# Patient Record
Sex: MALE | Race: WHITE | NOT HISPANIC OR LATINO | Employment: FULL TIME | URBAN - NONMETROPOLITAN AREA
[De-identification: names, ages, dates, MRNs, and addresses within clinical notes are randomized per-mention and may not be internally consistent; named-entity substitution may affect disease eponyms.]

---

## 2017-01-02 ENCOUNTER — HOSPITAL ENCOUNTER (OUTPATIENT)
Dept: GENERAL RADIOLOGY | Facility: HOSPITAL | Age: 32
End: 2017-01-02

## 2017-01-03 ENCOUNTER — HOSPITAL ENCOUNTER (OUTPATIENT)
Dept: GENERAL RADIOLOGY | Facility: HOSPITAL | Age: 32
Discharge: HOME | End: 2017-01-03
Admitting: PEDIATRICS
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
## 2017-01-03 PROCEDURE — 73110 X-RAY EXAM OF WRIST: CPT

## 2017-01-14 ENCOUNTER — APPOINTMENT (OUTPATIENT)
Dept: GENERAL RADIOLOGY | Age: 32
End: 2017-01-14
Payer: MEDICAID

## 2017-01-14 ENCOUNTER — HOSPITAL ENCOUNTER (EMERGENCY)
Age: 32
Discharge: HOME | End: 2017-01-14
Payer: MEDICAID

## 2017-01-14 VITALS
HEIGHT: 69 IN | RESPIRATION RATE: 16 BRPM | DIASTOLIC BLOOD PRESSURE: 90 MMHG | SYSTOLIC BLOOD PRESSURE: 139 MMHG | BODY MASS INDEX: 29.62 KG/M2 | TEMPERATURE: 97.8 F | HEART RATE: 80 BPM | WEIGHT: 200 LBS | OXYGEN SATURATION: 98 %

## 2017-01-14 DIAGNOSIS — S22.31XA CLOSED FRACTURE OF ONE RIB OF RIGHT SIDE, INITIAL ENCOUNTER: Primary | ICD-10-CM

## 2017-01-14 PROCEDURE — 99282 EMERGENCY DEPT VISIT SF MDM: CPT | Performed by: NURSE PRACTITIONER

## 2017-01-14 PROCEDURE — 6360000002 HC RX W HCPCS: Performed by: NURSE PRACTITIONER

## 2017-01-14 PROCEDURE — 99283 EMERGENCY DEPT VISIT LOW MDM: CPT

## 2017-01-14 PROCEDURE — 96372 THER/PROPH/DIAG INJ SC/IM: CPT

## 2017-01-14 PROCEDURE — 72100 X-RAY EXAM L-S SPINE 2/3 VWS: CPT

## 2017-01-14 RX ORDER — CITALOPRAM 10 MG/1
10 TABLET ORAL DAILY
COMMUNITY
End: 2020-02-17

## 2017-01-14 RX ORDER — HYDROCODONE BITARTRATE AND ACETAMINOPHEN 7.5; 325 MG/1; MG/1
1 TABLET ORAL EVERY 6 HOURS PRN
Qty: 20 TABLET | Refills: 0 | Status: SHIPPED | OUTPATIENT
Start: 2017-01-14 | End: 2020-02-17

## 2017-01-14 RX ORDER — ORPHENADRINE CITRATE 30 MG/ML
60 INJECTION INTRAMUSCULAR; INTRAVENOUS ONCE
Status: COMPLETED | OUTPATIENT
Start: 2017-01-14 | End: 2017-01-14

## 2017-01-14 RX ORDER — KETOROLAC TROMETHAMINE 30 MG/ML
60 INJECTION, SOLUTION INTRAMUSCULAR; INTRAVENOUS ONCE
Status: COMPLETED | OUTPATIENT
Start: 2017-01-14 | End: 2017-01-14

## 2017-01-14 RX ADMIN — ORPHENADRINE CITRATE 60 MG: 30 INJECTION INTRAMUSCULAR; INTRAVENOUS at 07:23

## 2017-01-14 RX ADMIN — KETOROLAC TROMETHAMINE 60 MG: 30 INJECTION, SOLUTION INTRAMUSCULAR at 07:23

## 2017-01-14 RX ADMIN — HYDROMORPHONE HYDROCHLORIDE 1 MG: 1 INJECTION, SOLUTION INTRAMUSCULAR; INTRAVENOUS; SUBCUTANEOUS at 07:24

## 2017-01-14 ASSESSMENT — ENCOUNTER SYMPTOMS: RESPIRATORY NEGATIVE: 1

## 2017-01-14 ASSESSMENT — PAIN DESCRIPTION - PAIN TYPE: TYPE: ACUTE PAIN

## 2017-01-14 ASSESSMENT — PAIN SCALES - GENERAL
PAINLEVEL_OUTOF10: 10
PAINLEVEL_OUTOF10: 5

## 2017-01-14 ASSESSMENT — PAIN DESCRIPTION - LOCATION: LOCATION: BACK

## 2017-03-07 ENCOUNTER — HOSPITAL ENCOUNTER (EMERGENCY)
Age: 32
Discharge: LEFT BEFORE DISCHARGE | End: 2017-03-07
Attending: EMERGENCY MEDICINE
Payer: MEDICAID

## 2017-03-07 ENCOUNTER — APPOINTMENT (OUTPATIENT)
Dept: CT IMAGING | Age: 32
End: 2017-03-07
Payer: MEDICAID

## 2017-03-07 VITALS
DIASTOLIC BLOOD PRESSURE: 94 MMHG | SYSTOLIC BLOOD PRESSURE: 135 MMHG | TEMPERATURE: 97.4 F | HEIGHT: 69 IN | WEIGHT: 200 LBS | RESPIRATION RATE: 20 BRPM | OXYGEN SATURATION: 96 % | HEART RATE: 101 BPM | BODY MASS INDEX: 29.62 KG/M2

## 2017-03-07 DIAGNOSIS — R51.9 HEADACHE, UNSPECIFIED HEADACHE TYPE: Primary | ICD-10-CM

## 2017-03-07 LAB
HCT VFR BLD CALC: 47.5 % (ref 42–52)
HEMOGLOBIN: 16.1 G/DL (ref 14–18)
INR BLD: 0.94 (ref 0.88–1.18)
MCH RBC QN AUTO: 30.1 PG (ref 27–31)
MCHC RBC AUTO-ENTMCNC: 33.9 G/DL (ref 33–37)
MCV RBC AUTO: 88.8 FL (ref 80–94)
PDW BLD-RTO: 13.3 % (ref 11.5–14.5)
PLATELET # BLD: 280 K/UL (ref 130–400)
PMV BLD AUTO: 10 FL (ref 7.4–10.4)
PROTHROMBIN TIME: 12.6 SEC (ref 12–14.6)
RBC # BLD: 5.35 M/UL (ref 4.7–6.1)
WBC # BLD: 9.6 K/UL (ref 4.8–10.8)

## 2017-03-07 PROCEDURE — 99282 EMERGENCY DEPT VISIT SF MDM: CPT | Performed by: EMERGENCY MEDICINE

## 2017-03-07 PROCEDURE — 6360000002 HC RX W HCPCS: Performed by: EMERGENCY MEDICINE

## 2017-03-07 PROCEDURE — 96374 THER/PROPH/DIAG INJ IV PUSH: CPT

## 2017-03-07 PROCEDURE — 85027 COMPLETE CBC AUTOMATED: CPT

## 2017-03-07 PROCEDURE — 99284 EMERGENCY DEPT VISIT MOD MDM: CPT

## 2017-03-07 PROCEDURE — 36415 COLL VENOUS BLD VENIPUNCTURE: CPT

## 2017-03-07 PROCEDURE — 70450 CT HEAD/BRAIN W/O DYE: CPT

## 2017-03-07 PROCEDURE — 2580000003 HC RX 258: Performed by: EMERGENCY MEDICINE

## 2017-03-07 PROCEDURE — 85610 PROTHROMBIN TIME: CPT

## 2017-03-07 PROCEDURE — 96372 THER/PROPH/DIAG INJ SC/IM: CPT

## 2017-03-07 RX ORDER — 0.9 % SODIUM CHLORIDE 0.9 %
1000 INTRAVENOUS SOLUTION INTRAVENOUS ONCE
Status: COMPLETED | OUTPATIENT
Start: 2017-03-07 | End: 2017-03-07

## 2017-03-07 RX ORDER — PROMETHAZINE HYDROCHLORIDE 25 MG/ML
6.25 INJECTION, SOLUTION INTRAMUSCULAR; INTRAVENOUS ONCE
Status: COMPLETED | OUTPATIENT
Start: 2017-03-07 | End: 2017-03-07

## 2017-03-07 RX ORDER — KETOROLAC TROMETHAMINE 30 MG/ML
30 INJECTION, SOLUTION INTRAMUSCULAR; INTRAVENOUS ONCE
Status: COMPLETED | OUTPATIENT
Start: 2017-03-07 | End: 2017-03-07

## 2017-03-07 RX ADMIN — SODIUM CHLORIDE 1000 ML: 9 INJECTION, SOLUTION INTRAVENOUS at 13:26

## 2017-03-07 RX ADMIN — PROMETHAZINE HYDROCHLORIDE 6.25 MG: 25 INJECTION, SOLUTION INTRAMUSCULAR; INTRAVENOUS at 13:26

## 2017-03-07 RX ADMIN — KETOROLAC TROMETHAMINE 30 MG: 30 INJECTION, SOLUTION INTRAMUSCULAR at 13:26

## 2017-03-07 ASSESSMENT — ENCOUNTER SYMPTOMS
SWOLLEN GLANDS: 0
EYE PAIN: 0
VOMITING: 1
DIARRHEA: 0
BLURRED VISION: 0
ALLERGIC/IMMUNOLOGIC NEGATIVE: 1
ABDOMINAL PAIN: 0
SINUS PRESSURE: 0
SORE THROAT: 0
NAUSEA: 1
TINGLING: 0
COUGH: 0
PHOTOPHOBIA: 0
BACK PAIN: 0
VISUAL CHANGE: 0
EYES NEGATIVE: 1
RESPIRATORY NEGATIVE: 1

## 2017-03-07 ASSESSMENT — PAIN SCALES - GENERAL
PAINLEVEL_OUTOF10: 10
PAINLEVEL_OUTOF10: 8

## 2017-03-07 ASSESSMENT — PAIN DESCRIPTION - LOCATION: LOCATION: HEAD

## 2017-03-07 ASSESSMENT — PAIN DESCRIPTION - PAIN TYPE: TYPE: ACUTE PAIN

## 2017-10-14 ENCOUNTER — HOSPITAL ENCOUNTER (EMERGENCY)
Facility: HOSPITAL | Age: 32
Discharge: HOME | End: 2017-10-14
Attending: EMERGENCY MEDICINE | Admitting: EMERGENCY MEDICINE

## 2017-10-14 VITALS
HEART RATE: 108 BPM | WEIGHT: 180 LBS | RESPIRATION RATE: 16 BRPM | OXYGEN SATURATION: 100 % | DIASTOLIC BLOOD PRESSURE: 76 MMHG | SYSTOLIC BLOOD PRESSURE: 127 MMHG | TEMPERATURE: 98 F | HEIGHT: 70 IN | BODY MASS INDEX: 25.77 KG/M2

## 2017-10-14 DIAGNOSIS — R10.9 FLANK PAIN: Primary | ICD-10-CM

## 2017-10-14 PROCEDURE — 99282 EMERGENCY DEPT VISIT SF MDM: CPT

## 2017-10-14 RX ORDER — LAMOTRIGINE 100 MG/1
100 TABLET ORAL DAILY
COMMUNITY

## 2017-10-14 RX ORDER — METHYLPHENIDATE HYDROCHLORIDE 18 MG/1
36 TABLET, EXTENDED RELEASE ORAL EVERY MORNING
COMMUNITY
End: 2017-11-26 | Stop reason: HOSPADM

## 2017-10-14 RX ORDER — LISINOPRIL 10 MG/1
10 TABLET ORAL EVERY MORNING
COMMUNITY

## 2017-10-14 NOTE — DISCHARGE INSTRUCTIONS

## 2017-10-14 NOTE — ED PROVIDER NOTES
Subjective patient is a 32-year-old male who presents to the ER with right flank pain.  Patient states that around 10 PM last night he developed the sudden onset of right flank pain.  Patient describes the pain as sharp and radiated to his right lower quadrant.  Patient states he has had kidney stones in the past and the pain was identical.  Patient states the pain lasted for approximately 2-3 hours and he has had no pain since that time.  Patient states that while he was having the flank pain he also had urinary hesitancy.  Patient states he was then able urinate and has had no pain since that time.  Patient had have surgery to remove his previous stone.  Patient currently has no pain at all.  He denies any fever, chest pain, shortness of breath, nausea vomiting diarrhea,  Dysuria, hematuria, neurological changes.    History provided by:  Patient   used: No        Review of Systems   Constitutional: Negative.    HENT: Negative.    Eyes: Negative.    Respiratory: Negative.    Cardiovascular: Negative.    Gastrointestinal: Positive for abdominal pain.   Endocrine: Negative.    Genitourinary: Positive for difficulty urinating and flank pain.   Skin: Negative.    Allergic/Immunologic: Negative.    Neurological: Negative.    Hematological: Negative.    Psychiatric/Behavioral: Negative.    All other systems reviewed and are negative.      Past Medical History:   Diagnosis Date   • Hypertension    • Kidney stone        Allergies   Allergen Reactions   • Penicillins Anaphylaxis       Past Surgical History:   Procedure Laterality Date   • FL CYSTO URETHROSCOPY W REM FB     • MANDIBLE FRACTURE SURGERY         History reviewed. No pertinent family history.    Social History     Social History   • Marital status:      Spouse name: N/A   • Number of children: N/A   • Years of education: N/A     Social History Main Topics   • Smoking status: Current Some Day Smoker     Packs/day: 0.50     Types:  Cigarettes   • Smokeless tobacco: Never Used   • Alcohol use No   • Drug use: No   • Sexual activity: Defer     Other Topics Concern   • None     Social History Narrative   • None           Objective   Physical Exam   Constitutional: He is oriented to person, place, and time. He appears well-developed and well-nourished.   HENT:   Head: Normocephalic and atraumatic.   Eyes: Conjunctivae are normal. Pupils are equal, round, and reactive to light.   Neck: Normal range of motion.   Cardiovascular: Normal rate, regular rhythm and normal heart sounds.    Pulmonary/Chest: Effort normal and breath sounds normal.   Abdominal: Soft. There is no tenderness. There is no rigidity, no rebound, no guarding and no CVA tenderness.   Musculoskeletal: Normal range of motion. He exhibits no edema or deformity.   Neurological: He is alert and oriented to person, place, and time. He has normal strength.   Skin: Skin is warm.   Psychiatric: He has a normal mood and affect. His behavior is normal.   Nursing note and vitals reviewed.      Procedures         ED Course  ED Course       exam was unremarkable.  Patient was currently asymptomatic.  I offered to perform labs and CT stone protocol to rule out stone and infection.  Patient states since he currently had no symptoms he did not want any treatment at this time.  He understands the risk of infection but states he will return if he develops any pain, fever or vomiting.  Patient was advised to follow-up with his PCP and his urologist.  Return for any symptoms.            MDM  Number of Diagnoses or Management Options  Flank pain:       Final diagnoses:   Flank pain            Alina Durham MD  10/14/17 0921

## 2017-10-17 NOTE — ED NOTES
"ED Call Back Questions    1. How are you doing since leaving the Emergency Department?    Feels great, no pain at all  2. Do you have any questions about your discharge instructions? No     3. Have you filled your new prescriptions yet? N/A  a. Do you have any questions about those medications? No     4. Were you able to make a follow-up appointment with the physician? Yes     5. Do you have a primary care physician? Yes   a. If No, would you like for me to set you up with one? No   i. If Yes, “I will have our ED  give you a call right back at this number to work with you on the best time for an appointment.”    6. We are always looking to get better at what we do. Do you have any suggestions for what we can do to be even better? No   a. If Yes, \"Thank you for sharing your concerns. I apologize. I will follow up with our manager and patient . Would you like someone to call you back?\" No     7. Is there anything else I can do for you? No   Everyone was very nice     Terry Fritz  10/17/17 1413    "

## 2017-10-28 ENCOUNTER — HOSPITAL ENCOUNTER (EMERGENCY)
Facility: HOSPITAL | Age: 32
Discharge: HOME | End: 2017-10-28
Attending: EMERGENCY MEDICINE | Admitting: EMERGENCY MEDICINE

## 2017-10-28 ENCOUNTER — APPOINTMENT (OUTPATIENT)
Dept: CT IMAGING | Facility: HOSPITAL | Age: 32
End: 2017-10-28

## 2017-10-28 VITALS
WEIGHT: 176 LBS | HEART RATE: 93 BPM | DIASTOLIC BLOOD PRESSURE: 90 MMHG | HEIGHT: 70 IN | TEMPERATURE: 97.8 F | BODY MASS INDEX: 25.2 KG/M2 | RESPIRATION RATE: 17 BRPM | OXYGEN SATURATION: 97 % | SYSTOLIC BLOOD PRESSURE: 143 MMHG

## 2017-10-28 DIAGNOSIS — N20.0 KIDNEY STONE: Primary | ICD-10-CM

## 2017-10-28 LAB
ALBUMIN SERPL-MCNC: 4.6 G/DL (ref 3.5–5)
ALBUMIN/GLOB SERPL: 1.6 G/DL (ref 1.1–2.5)
ALP SERPL-CCNC: 65 U/L (ref 24–120)
ALT SERPL W P-5'-P-CCNC: 51 U/L (ref 0–54)
ANION GAP SERPL CALCULATED.3IONS-SCNC: 13 MMOL/L (ref 4–13)
AST SERPL-CCNC: 31 U/L (ref 7–45)
BACTERIA UR QL AUTO: ABNORMAL /HPF
BASOPHILS # BLD AUTO: 0.05 10*3/MM3 (ref 0–0.2)
BASOPHILS NFR BLD AUTO: 0.4 % (ref 0–2)
BILIRUB SERPL-MCNC: 0.3 MG/DL (ref 0.1–1)
BILIRUB UR QL STRIP: NEGATIVE
BUN BLD-MCNC: 13 MG/DL (ref 5–21)
BUN/CREAT SERPL: 11.3 (ref 7–25)
CALCIUM SPEC-SCNC: 10.1 MG/DL (ref 8.4–10.4)
CHLORIDE SERPL-SCNC: 102 MMOL/L (ref 98–110)
CLARITY UR: ABNORMAL
CO2 SERPL-SCNC: 28 MMOL/L (ref 24–31)
COLOR UR: YELLOW
CREAT BLD-MCNC: 1.15 MG/DL (ref 0.5–1.4)
DEPRECATED RDW RBC AUTO: 41.5 FL (ref 40–54)
EOSINOPHIL # BLD AUTO: 0.17 10*3/MM3 (ref 0–0.7)
EOSINOPHIL NFR BLD AUTO: 1.3 % (ref 0–4)
ERYTHROCYTE [DISTWIDTH] IN BLOOD BY AUTOMATED COUNT: 12.9 % (ref 12–15)
GFR SERPL CREATININE-BSD FRML MDRD: 73 ML/MIN/1.73
GLOBULIN UR ELPH-MCNC: 2.9 GM/DL
GLUCOSE BLD-MCNC: 121 MG/DL (ref 70–100)
GLUCOSE UR STRIP-MCNC: NEGATIVE MG/DL
HCT VFR BLD AUTO: 43.6 % (ref 40–52)
HGB BLD-MCNC: 14.9 G/DL (ref 14–18)
HGB UR QL STRIP.AUTO: ABNORMAL
HYALINE CASTS UR QL AUTO: ABNORMAL /LPF
IMM GRANULOCYTES # BLD: 0.03 10*3/MM3 (ref 0–0.03)
IMM GRANULOCYTES NFR BLD: 0.2 % (ref 0–5)
KETONES UR QL STRIP: ABNORMAL
LEUKOCYTE ESTERASE UR QL STRIP.AUTO: ABNORMAL
LYMPHOCYTES # BLD AUTO: 1.47 10*3/MM3 (ref 0.72–4.86)
LYMPHOCYTES NFR BLD AUTO: 11.5 % (ref 15–45)
MCH RBC QN AUTO: 29.8 PG (ref 28–32)
MCHC RBC AUTO-ENTMCNC: 34.2 G/DL (ref 33–36)
MCV RBC AUTO: 87.2 FL (ref 82–95)
MONOCYTES # BLD AUTO: 0.87 10*3/MM3 (ref 0.19–1.3)
MONOCYTES NFR BLD AUTO: 6.8 % (ref 4–12)
NEUTROPHILS # BLD AUTO: 10.22 10*3/MM3 (ref 1.87–8.4)
NEUTROPHILS NFR BLD AUTO: 79.8 % (ref 39–78)
NITRITE UR QL STRIP: NEGATIVE
PH UR STRIP.AUTO: 8 [PH] (ref 5–8)
PLATELET # BLD AUTO: 284 10*3/MM3 (ref 130–400)
PMV BLD AUTO: 10.3 FL (ref 6–12)
POTASSIUM BLD-SCNC: 3.9 MMOL/L (ref 3.5–5.3)
PROT SERPL-MCNC: 7.5 G/DL (ref 6.3–8.7)
PROT UR QL STRIP: ABNORMAL
RBC # BLD AUTO: 5 10*6/MM3 (ref 4.8–5.9)
RBC # UR: ABNORMAL /HPF
REF LAB TEST METHOD: ABNORMAL
SODIUM BLD-SCNC: 143 MMOL/L (ref 135–145)
SP GR UR STRIP: 1.02 (ref 1–1.03)
SQUAMOUS #/AREA URNS HPF: ABNORMAL /HPF
UROBILINOGEN UR QL STRIP: ABNORMAL
WBC NRBC COR # BLD: 12.81 10*3/MM3 (ref 4.8–10.8)
WBC UR QL AUTO: ABNORMAL /HPF

## 2017-10-28 PROCEDURE — 80053 COMPREHEN METABOLIC PANEL: CPT | Performed by: EMERGENCY MEDICINE

## 2017-10-28 PROCEDURE — 74176 CT ABD & PELVIS W/O CONTRAST: CPT

## 2017-10-28 PROCEDURE — 96374 THER/PROPH/DIAG INJ IV PUSH: CPT

## 2017-10-28 PROCEDURE — 25010000002 KETOROLAC TROMETHAMINE PER 15 MG: Performed by: EMERGENCY MEDICINE

## 2017-10-28 PROCEDURE — 99284 EMERGENCY DEPT VISIT MOD MDM: CPT

## 2017-10-28 PROCEDURE — 25010000002 ONDANSETRON PER 1 MG: Performed by: EMERGENCY MEDICINE

## 2017-10-28 PROCEDURE — 85025 COMPLETE CBC W/AUTO DIFF WBC: CPT | Performed by: EMERGENCY MEDICINE

## 2017-10-28 PROCEDURE — 25010000002 HYDROMORPHONE PER 4 MG: Performed by: EMERGENCY MEDICINE

## 2017-10-28 PROCEDURE — 81001 URINALYSIS AUTO W/SCOPE: CPT | Performed by: EMERGENCY MEDICINE

## 2017-10-28 PROCEDURE — 96361 HYDRATE IV INFUSION ADD-ON: CPT

## 2017-10-28 RX ORDER — HYDROCODONE BITARTRATE AND ACETAMINOPHEN 5; 325 MG/1; MG/1
1 TABLET ORAL EVERY 4 HOURS PRN
Qty: 20 TABLET | Refills: 0 | Status: SHIPPED | OUTPATIENT
Start: 2017-10-28 | End: 2017-11-17

## 2017-10-28 RX ORDER — TAMSULOSIN HYDROCHLORIDE 0.4 MG/1
1 CAPSULE ORAL NIGHTLY
Qty: 30 CAPSULE | Refills: 0 | Status: SHIPPED | OUTPATIENT
Start: 2017-10-28 | End: 2017-11-17

## 2017-10-28 RX ORDER — ONDANSETRON 2 MG/ML
4 INJECTION INTRAMUSCULAR; INTRAVENOUS ONCE
Status: COMPLETED | OUTPATIENT
Start: 2017-10-28 | End: 2017-10-28

## 2017-10-28 RX ORDER — KETOROLAC TROMETHAMINE 15 MG/ML
60 INJECTION, SOLUTION INTRAMUSCULAR; INTRAVENOUS ONCE
Status: COMPLETED | OUTPATIENT
Start: 2017-10-28 | End: 2017-10-28

## 2017-10-28 RX ORDER — ONDANSETRON 4 MG/1
4 TABLET, ORALLY DISINTEGRATING ORAL EVERY 8 HOURS PRN
Qty: 10 TABLET | Refills: 0 | Status: SHIPPED | OUTPATIENT
Start: 2017-10-28 | End: 2017-11-26 | Stop reason: HOSPADM

## 2017-10-28 RX ORDER — KETOROLAC TROMETHAMINE 30 MG/ML
60 INJECTION, SOLUTION INTRAMUSCULAR; INTRAVENOUS ONCE
Status: DISCONTINUED | OUTPATIENT
Start: 2017-10-28 | End: 2017-10-28

## 2017-10-28 RX ADMIN — SODIUM CHLORIDE 1000 ML: 9 INJECTION, SOLUTION INTRAVENOUS at 21:02

## 2017-10-28 RX ADMIN — ONDANSETRON 4 MG: 2 INJECTION INTRAMUSCULAR; INTRAVENOUS at 20:59

## 2017-10-28 RX ADMIN — KETOROLAC TROMETHAMINE 60 MG: 15 INJECTION, SOLUTION INTRAMUSCULAR; INTRAVENOUS at 20:59

## 2017-10-28 RX ADMIN — HYDROMORPHONE HYDROCHLORIDE 1 MG: 1 INJECTION, SOLUTION INTRAMUSCULAR; INTRAVENOUS; SUBCUTANEOUS at 22:17

## 2017-10-29 NOTE — ED PROVIDER NOTES
Subjective   HPI Comments: 42-year-old gentleman presents for our facility with family members with a complaint of right-sided flank pain which started approximately 1 hour prior to presentation.  He has had several episodes of vomiting associated with this.  He is holding the right side of his flank states the pain is in this area a sharp pain 10 out of 10 intensity and radiates to the right groin area.    This started acute an hour ago.    He has a history of kidney stones he has had about a year ago.    He has passed urine after coming into the emergency room very small amount.        Patient is a 32 y.o. male presenting with flank pain.   History provided by:  Patient   used: No    Flank Pain   Pain location:  R flank  Pain quality: sharp    Pain severity:  Severe  Onset quality:  Sudden  Duration:  1 hour  Timing:  Constant  Progression:  Worsening  Chronicity:  Recurrent  Context: not alcohol use, not diet changes, not eating, not laxative use, not previous surgeries, not recent illness, not recent travel, not retching and not trauma    Relieved by:  Nothing  Worsened by:  Nothing  Ineffective treatments:  None tried  Associated symptoms: flatus    Associated symptoms: no chest pain, no chills, no constipation, no cough, no diarrhea, no fever, no hematemesis and no hematuria        Review of Systems   Constitutional: Negative.  Negative for chills and fever.   Respiratory: Negative for cough.    Cardiovascular: Negative for chest pain.   Gastrointestinal: Positive for flatus. Negative for constipation, diarrhea and hematemesis.   Genitourinary: Positive for flank pain. Negative for hematuria.   All other systems reviewed and are negative.      Past Medical History:   Diagnosis Date   • Hypertension    • Kidney stone        Allergies   Allergen Reactions   • Penicillins Anaphylaxis       Past Surgical History:   Procedure Laterality Date   • FL CYSTO URETHROSCOPY W REM FB     • MANDIBLE  FRACTURE SURGERY         History reviewed. No pertinent family history.    Social History     Social History   • Marital status:      Spouse name: N/A   • Number of children: N/A   • Years of education: N/A     Social History Main Topics   • Smoking status: Current Some Day Smoker     Packs/day: 0.50     Types: Cigarettes   • Smokeless tobacco: Never Used   • Alcohol use No   • Drug use: No   • Sexual activity: Defer     Other Topics Concern   • None     Social History Narrative           Objective   Physical Exam   Constitutional: He appears well-developed and well-nourished.   Significant amount of distress on examination he is unable to lay flat on the bed because of the pain discomfort he is experiencing is doubled over with a bag because of his nausea he was to vomit.   HENT:   Head: Normocephalic and atraumatic.   Eyes: Conjunctivae and EOM are normal. Pupils are equal, round, and reactive to light.   Neck: Normal range of motion. Neck supple. No JVD present.   Cardiovascular: Normal rate and regular rhythm.    Pulmonary/Chest: Effort normal and breath sounds normal. No stridor.   Abdominal: Soft. Bowel sounds are normal. He exhibits no mass. There is no rebound and no guarding. No hernia.   Significant amount of tenderness in the right flank area in the right lower quadrant area.   Musculoskeletal: Normal range of motion.   Lymphadenopathy:     He has no cervical adenopathy.   Neurological: He is alert. He has normal reflexes.   Skin: Skin is warm and dry. No rash noted. No erythema.   Psychiatric: He has a normal mood and affect. His behavior is normal. Judgment and thought content normal.   Vitals reviewed.      Procedures         ED Course  ED Course   Comment By Time   4 mm obstructive stone in the distal right ureter with associated mild  right hydronephrosis and moderate right hydroureter. Nano LNADEROS MD 10/28 2154   On repeat examination he sleeping comfortably. Nano LANDEROS MD 10/28 2159    He has a obstructing stone in the right ureter. Nano LANDEROS MD 10/28 2200                  MDM  Number of Diagnoses or Management Options  Kidney stone: new and requires workup     Amount and/or Complexity of Data Reviewed  Clinical lab tests: reviewed and ordered  Tests in the radiology section of CPT®: reviewed and ordered  Discuss the patient with other providers: yes  Independent visualization of images, tracings, or specimens: yes    Risk of Complications, Morbidity, and/or Mortality  Presenting problems: high  Diagnostic procedures: high  Management options: high    Patient Progress  Patient progress: stable      Final diagnoses:   Kidney stone            Nano LANDEROS MD  10/29/17 5574

## 2017-11-01 NOTE — DISCHARGE INSTRUCTIONS
Clear liquid diet for next 2 days.  Hydrocodone for pain management.  Zofran for nausea medicine.  Flomax once a day.  Follow-up with your urologist early next week.  If symptoms increase was or change return back to the ER.   No

## 2017-11-09 ENCOUNTER — APPOINTMENT (OUTPATIENT)
Dept: ULTRASOUND IMAGING | Facility: HOSPITAL | Age: 32
End: 2017-11-09

## 2017-11-09 ENCOUNTER — HOSPITAL ENCOUNTER (EMERGENCY)
Facility: HOSPITAL | Age: 32
Discharge: HOME | End: 2017-11-09
Attending: EMERGENCY MEDICINE | Admitting: EMERGENCY MEDICINE

## 2017-11-09 VITALS
OXYGEN SATURATION: 100 % | WEIGHT: 180 LBS | DIASTOLIC BLOOD PRESSURE: 105 MMHG | SYSTOLIC BLOOD PRESSURE: 152 MMHG | TEMPERATURE: 97.4 F | HEIGHT: 70 IN | RESPIRATION RATE: 16 BRPM | BODY MASS INDEX: 25.77 KG/M2 | HEART RATE: 102 BPM

## 2017-11-09 DIAGNOSIS — N50.819 TESTICULAR PAIN: Primary | ICD-10-CM

## 2017-11-09 LAB
ALBUMIN SERPL-MCNC: 4.2 G/DL (ref 3.5–5)
ALBUMIN/GLOB SERPL: 1.6 G/DL (ref 1.1–2.5)
ALP SERPL-CCNC: 61 U/L (ref 24–120)
ALT SERPL W P-5'-P-CCNC: 55 U/L (ref 0–54)
ANION GAP SERPL CALCULATED.3IONS-SCNC: 13 MMOL/L (ref 4–13)
AST SERPL-CCNC: 30 U/L (ref 7–45)
BACTERIA UR QL AUTO: ABNORMAL /HPF
BASOPHILS # BLD AUTO: 0.07 10*3/MM3 (ref 0–0.2)
BASOPHILS NFR BLD AUTO: 0.9 % (ref 0–2)
BILIRUB SERPL-MCNC: 0.1 MG/DL (ref 0.1–1)
BILIRUB UR QL STRIP: NEGATIVE
BUN BLD-MCNC: 13 MG/DL (ref 5–21)
BUN/CREAT SERPL: 13.1 (ref 7–25)
CALCIUM SPEC-SCNC: 9.5 MG/DL (ref 8.4–10.4)
CHLORIDE SERPL-SCNC: 101 MMOL/L (ref 98–110)
CLARITY UR: CLEAR
CO2 SERPL-SCNC: 27 MMOL/L (ref 24–31)
COLOR UR: YELLOW
CREAT BLD-MCNC: 0.99 MG/DL (ref 0.5–1.4)
DEPRECATED RDW RBC AUTO: 43 FL (ref 40–54)
EOSINOPHIL # BLD AUTO: 0.38 10*3/MM3 (ref 0–0.7)
EOSINOPHIL NFR BLD AUTO: 4.8 % (ref 0–4)
ERYTHROCYTE [DISTWIDTH] IN BLOOD BY AUTOMATED COUNT: 13.8 % (ref 12–15)
GFR SERPL CREATININE-BSD FRML MDRD: 87 ML/MIN/1.73
GLOBULIN UR ELPH-MCNC: 2.7 GM/DL
GLUCOSE BLD-MCNC: 105 MG/DL (ref 70–100)
GLUCOSE UR STRIP-MCNC: NEGATIVE MG/DL
HCT VFR BLD AUTO: 44.1 % (ref 40–52)
HGB BLD-MCNC: 14.7 G/DL (ref 14–18)
HGB UR QL STRIP.AUTO: NEGATIVE
HYALINE CASTS UR QL AUTO: ABNORMAL /LPF
KETONES UR QL STRIP: NEGATIVE
LEUKOCYTE ESTERASE UR QL STRIP.AUTO: ABNORMAL
LYMPHOCYTES # BLD AUTO: 3.08 10*3/MM3 (ref 0.72–4.86)
LYMPHOCYTES NFR BLD AUTO: 38.6 % (ref 15–45)
MCH RBC QN AUTO: 29.1 PG (ref 28–32)
MCHC RBC AUTO-ENTMCNC: 33.3 G/DL (ref 33–36)
MCV RBC AUTO: 87.3 FL (ref 82–95)
MONOCYTES # BLD AUTO: 0.76 10*3/MM3 (ref 0.19–1.3)
MONOCYTES NFR BLD AUTO: 9.5 % (ref 4–12)
NEUTROPHILS # BLD AUTO: 3.68 10*3/MM3 (ref 1.87–8.4)
NEUTROPHILS NFR BLD AUTO: 46.2 % (ref 39–78)
NITRITE UR QL STRIP: NEGATIVE
PH UR STRIP.AUTO: 6.5 [PH] (ref 5–8)
PLATELET # BLD AUTO: 290 10*3/MM3 (ref 130–400)
PMV BLD AUTO: 9.6 FL (ref 6–12)
POTASSIUM BLD-SCNC: 4.3 MMOL/L (ref 3.5–5.3)
PROT SERPL-MCNC: 6.9 G/DL (ref 6.3–8.7)
PROT UR QL STRIP: NEGATIVE
RBC # BLD AUTO: 5.05 10*6/MM3 (ref 4.8–5.9)
RBC # UR: ABNORMAL /HPF
REF LAB TEST METHOD: ABNORMAL
SODIUM BLD-SCNC: 141 MMOL/L (ref 135–145)
SP GR UR STRIP: 1.03 (ref 1–1.03)
SQUAMOUS #/AREA URNS HPF: ABNORMAL /HPF
UROBILINOGEN UR QL STRIP: ABNORMAL
WBC NRBC COR # BLD: 7.97 10*3/MM3 (ref 4.8–10.8)
WBC UR QL AUTO: ABNORMAL /HPF

## 2017-11-09 PROCEDURE — 76870 US EXAM SCROTUM: CPT

## 2017-11-09 PROCEDURE — 80053 COMPREHEN METABOLIC PANEL: CPT | Performed by: EMERGENCY MEDICINE

## 2017-11-09 PROCEDURE — 81001 URINALYSIS AUTO W/SCOPE: CPT | Performed by: EMERGENCY MEDICINE

## 2017-11-09 PROCEDURE — 99284 EMERGENCY DEPT VISIT MOD MDM: CPT

## 2017-11-09 PROCEDURE — 85025 COMPLETE CBC W/AUTO DIFF WBC: CPT | Performed by: EMERGENCY MEDICINE

## 2017-11-10 NOTE — ED PROVIDER NOTES
Subjective   Patient is a 32 y.o. male presenting with male genitourinary complaint.   Male  Problem   Presenting symptoms: dysuria and scrotal pain    Presenting symptoms: no penile discharge, no penile pain and no swelling    Context: after urination    Context: not after injury, not during intercourse and not during urination    Relieved by:  Nothing  Worsened by:  Nothing  Ineffective treatments:  None tried  Associated symptoms: scrotal swelling    Associated symptoms: no diarrhea, no fever, no flank pain, no genital itching, no genital lesions, no groin pain, no hematuria, no nausea, no penile redness, no penile swelling, no urinary frequency, no urinary hesitation, no urinary incontinence, no urinary retention and no vomiting    Risk factors: no bladder surgery, no change in medication, no erectile dysfunction, does not have multiple sexual partners, no new sexual partner, no recent infection, not currently sexually active, no unprotected sex and no urinary catheter        Review of Systems   Constitutional: Negative.  Negative for fever.   HENT: Negative.    Eyes: Negative.    Respiratory: Negative.    Cardiovascular: Negative.    Gastrointestinal: Negative for diarrhea, nausea and vomiting.   Endocrine: Negative.    Genitourinary: Positive for dysuria and scrotal swelling. Negative for bladder incontinence, discharge, flank pain, frequency, hematuria, hesitancy, penile pain and penile swelling.   Skin: Negative.    Neurological: Negative.    Hematological: Negative.    All other systems reviewed and are negative.      Past Medical History:   Diagnosis Date   • Hypertension    • Kidney stone        Allergies   Allergen Reactions   • Penicillins Anaphylaxis       Past Surgical History:   Procedure Laterality Date   • FL CYSTO URETHROSCOPY W REM FB     • MANDIBLE FRACTURE SURGERY         History reviewed. No pertinent family history.    Social History     Social History   • Marital status:       Spouse name: N/A   • Number of children: N/A   • Years of education: N/A     Social History Main Topics   • Smoking status: Current Some Day Smoker     Packs/day: 0.50     Types: Cigarettes   • Smokeless tobacco: Never Used   • Alcohol use No   • Drug use: No   • Sexual activity: Defer     Other Topics Concern   • None     Social History Narrative           Objective   Physical Exam   Constitutional: He is oriented to person, place, and time. He appears well-developed and well-nourished.  Non-toxic appearance.   HENT:   Head: Normocephalic and atraumatic.   Mouth/Throat: Oropharynx is clear and moist.   Eyes: Conjunctivae are normal. Pupils are equal, round, and reactive to light.   Neck: Normal range of motion. Neck supple. No hepatojugular reflux and no JVD present.   Cardiovascular: Normal rate, regular rhythm, normal heart sounds and intact distal pulses.  PMI is not displaced.  Exam reveals no decreased pulses.    No murmur heard.  Pulmonary/Chest: Effort normal and breath sounds normal. No accessory muscle usage. No apnea. No respiratory distress. He has no decreased breath sounds. He has no wheezes.   Abdominal: Normal appearance, normal aorta and bowel sounds are normal. He exhibits no shifting dullness, no distension, no fluid wave, no abdominal bruit, no ascites, no pulsatile midline mass and no mass. There is no tenderness. There is no guarding.   Genitourinary: Left testis shows mass and tenderness. Left testis shows no swelling. Left testis is descended. Penile tenderness present. No phimosis. No discharge found.   Musculoskeletal: Normal range of motion.   Neurological: He is alert and oriented to person, place, and time. He has normal strength and normal reflexes. No cranial nerve deficit. GCS eye subscore is 4. GCS verbal subscore is 5. GCS motor subscore is 6.   Skin: Skin is warm and dry.   Psychiatric: He has a normal mood and affect. His behavior is normal.   Nursing note and vitals  reviewed.      Procedures         ED Course  ED Course                  MDM    Final diagnoses:   Testicular pain            Zane Guevara MD  11/09/17 2026

## 2017-11-10 NOTE — ED NOTES
Radiology contacted. Spoke with Mai. Mai reports that ultrasound will be called in      Amy Benz RN  11/09/17 2052

## 2017-11-15 ENCOUNTER — HOSPITAL ENCOUNTER (EMERGENCY)
Facility: HOSPITAL | Age: 32
Discharge: HOME | End: 2017-11-15
Attending: EMERGENCY MEDICINE | Admitting: EMERGENCY MEDICINE

## 2017-11-15 ENCOUNTER — APPOINTMENT (OUTPATIENT)
Dept: CT IMAGING | Facility: HOSPITAL | Age: 32
End: 2017-11-15

## 2017-11-15 VITALS
OXYGEN SATURATION: 98 % | TEMPERATURE: 97.8 F | HEART RATE: 106 BPM | BODY MASS INDEX: 27.25 KG/M2 | WEIGHT: 184 LBS | HEIGHT: 69 IN | RESPIRATION RATE: 16 BRPM | DIASTOLIC BLOOD PRESSURE: 114 MMHG | SYSTOLIC BLOOD PRESSURE: 151 MMHG

## 2017-11-15 DIAGNOSIS — N23 RENAL COLIC ON RIGHT SIDE: Primary | ICD-10-CM

## 2017-11-15 LAB
ALBUMIN SERPL-MCNC: 4.9 G/DL (ref 3.5–5)
ALBUMIN/GLOB SERPL: 1.5 G/DL (ref 1.1–2.5)
ALP SERPL-CCNC: 70 U/L (ref 24–120)
ALT SERPL W P-5'-P-CCNC: 67 U/L (ref 0–54)
ANION GAP SERPL CALCULATED.3IONS-SCNC: 13 MMOL/L (ref 4–13)
AST SERPL-CCNC: 47 U/L (ref 7–45)
BACTERIA UR QL AUTO: ABNORMAL /HPF
BASOPHILS # BLD AUTO: 0.06 10*3/MM3 (ref 0–0.2)
BASOPHILS NFR BLD AUTO: 0.4 % (ref 0–2)
BILIRUB SERPL-MCNC: 0.4 MG/DL (ref 0.1–1)
BILIRUB UR QL STRIP: NEGATIVE
BUN BLD-MCNC: 15 MG/DL (ref 5–21)
BUN/CREAT SERPL: 11.9 (ref 7–25)
CALCIUM SPEC-SCNC: 9.9 MG/DL (ref 8.4–10.4)
CHLORIDE SERPL-SCNC: 95 MMOL/L (ref 98–110)
CLARITY UR: CLEAR
CO2 SERPL-SCNC: 32 MMOL/L (ref 24–31)
COLOR UR: YELLOW
CREAT BLD-MCNC: 1.26 MG/DL (ref 0.5–1.4)
DEPRECATED RDW RBC AUTO: 42.6 FL (ref 40–54)
EOSINOPHIL # BLD AUTO: 0.35 10*3/MM3 (ref 0–0.7)
EOSINOPHIL NFR BLD AUTO: 2.6 % (ref 0–4)
ERYTHROCYTE [DISTWIDTH] IN BLOOD BY AUTOMATED COUNT: 13 % (ref 12–15)
GFR SERPL CREATININE-BSD FRML MDRD: 66 ML/MIN/1.73
GLOBULIN UR ELPH-MCNC: 3.2 GM/DL
GLUCOSE BLD-MCNC: 95 MG/DL (ref 70–100)
GLUCOSE UR STRIP-MCNC: NEGATIVE MG/DL
HCT VFR BLD AUTO: 46.4 % (ref 40–52)
HGB BLD-MCNC: 15.6 G/DL (ref 14–18)
HGB UR QL STRIP.AUTO: ABNORMAL
IMM GRANULOCYTES # BLD: 0.03 10*3/MM3 (ref 0–0.03)
IMM GRANULOCYTES NFR BLD: 0.2 % (ref 0–5)
KETONES UR QL STRIP: NEGATIVE
LEUKOCYTE ESTERASE UR QL STRIP.AUTO: ABNORMAL
LYMPHOCYTES # BLD AUTO: 2.44 10*3/MM3 (ref 0.72–4.86)
LYMPHOCYTES NFR BLD AUTO: 18.1 % (ref 15–45)
MCH RBC QN AUTO: 30.2 PG (ref 28–32)
MCHC RBC AUTO-ENTMCNC: 33.6 G/DL (ref 33–36)
MCV RBC AUTO: 89.7 FL (ref 82–95)
MONOCYTES # BLD AUTO: 0.96 10*3/MM3 (ref 0.19–1.3)
MONOCYTES NFR BLD AUTO: 7.1 % (ref 4–12)
NEUTROPHILS # BLD AUTO: 9.63 10*3/MM3 (ref 1.87–8.4)
NEUTROPHILS NFR BLD AUTO: 71.6 % (ref 39–78)
NITRITE UR QL STRIP: NEGATIVE
NRBC BLD MANUAL-RTO: 0 /100 WBC (ref 0–0)
PH UR STRIP.AUTO: 6.5 [PH] (ref 5–8)
PLATELET # BLD AUTO: 310 10*3/MM3 (ref 130–400)
PMV BLD AUTO: 10.1 FL (ref 6–12)
POTASSIUM BLD-SCNC: 4.4 MMOL/L (ref 3.5–5.3)
PROT SERPL-MCNC: 8.1 G/DL (ref 6.3–8.7)
PROT UR QL STRIP: NEGATIVE
RBC # BLD AUTO: 5.17 10*6/MM3 (ref 4.8–5.9)
RBC # UR: ABNORMAL /HPF
REF LAB TEST METHOD: ABNORMAL
SODIUM BLD-SCNC: 140 MMOL/L (ref 135–145)
SP GR UR STRIP: 1 (ref 1–1.03)
SQUAMOUS #/AREA URNS HPF: ABNORMAL /HPF
UROBILINOGEN UR QL STRIP: ABNORMAL
WBC NRBC COR # BLD: 13.47 10*3/MM3 (ref 4.8–10.8)
WBC UR QL AUTO: ABNORMAL /HPF

## 2017-11-15 PROCEDURE — 96375 TX/PRO/DX INJ NEW DRUG ADDON: CPT

## 2017-11-15 PROCEDURE — 80053 COMPREHEN METABOLIC PANEL: CPT | Performed by: EMERGENCY MEDICINE

## 2017-11-15 PROCEDURE — 25010000002 ONDANSETRON PER 1 MG

## 2017-11-15 PROCEDURE — 25010000002 ONDANSETRON PER 1 MG: Performed by: EMERGENCY MEDICINE

## 2017-11-15 PROCEDURE — 25010000002 KETOROLAC TROMETHAMINE PER 15 MG

## 2017-11-15 PROCEDURE — 96374 THER/PROPH/DIAG INJ IV PUSH: CPT

## 2017-11-15 PROCEDURE — 99283 EMERGENCY DEPT VISIT LOW MDM: CPT

## 2017-11-15 PROCEDURE — 81001 URINALYSIS AUTO W/SCOPE: CPT | Performed by: EMERGENCY MEDICINE

## 2017-11-15 PROCEDURE — 25010000002 HYDROMORPHONE PER 4 MG

## 2017-11-15 PROCEDURE — 85025 COMPLETE CBC W/AUTO DIFF WBC: CPT | Performed by: EMERGENCY MEDICINE

## 2017-11-15 PROCEDURE — 25010000002 HYDROMORPHONE PER 4 MG: Performed by: EMERGENCY MEDICINE

## 2017-11-15 PROCEDURE — 25010000002 KETOROLAC TROMETHAMINE PER 15 MG: Performed by: EMERGENCY MEDICINE

## 2017-11-15 PROCEDURE — 99284 EMERGENCY DEPT VISIT MOD MDM: CPT

## 2017-11-15 PROCEDURE — 74176 CT ABD & PELVIS W/O CONTRAST: CPT

## 2017-11-15 RX ORDER — LISINOPRIL 10 MG/1
20 TABLET ORAL ONCE
Status: DISCONTINUED | OUTPATIENT
Start: 2017-11-15 | End: 2017-11-15 | Stop reason: SDUPTHER

## 2017-11-15 RX ORDER — NAPROXEN 500 MG/1
500 TABLET ORAL 2 TIMES DAILY WITH MEALS
Qty: 20 TABLET | Refills: 0 | Status: SHIPPED | OUTPATIENT
Start: 2017-11-15

## 2017-11-15 RX ORDER — SODIUM CHLORIDE 0.9 % (FLUSH) 0.9 %
10 SYRINGE (ML) INJECTION AS NEEDED
Status: DISCONTINUED | OUTPATIENT
Start: 2017-11-15 | End: 2017-11-15 | Stop reason: HOSPADM

## 2017-11-15 RX ORDER — HYDROCODONE BITARTRATE AND ACETAMINOPHEN 5; 325 MG/1; MG/1
1 TABLET ORAL EVERY 6 HOURS PRN
Qty: 10 TABLET | Refills: 0 | Status: ON HOLD | OUTPATIENT
Start: 2017-11-15 | End: 2017-11-20

## 2017-11-15 RX ORDER — ONDANSETRON 2 MG/ML
4 INJECTION INTRAMUSCULAR; INTRAVENOUS ONCE
Status: COMPLETED | OUTPATIENT
Start: 2017-11-15 | End: 2017-11-15

## 2017-11-15 RX ORDER — LISINOPRIL 10 MG/1
20 TABLET ORAL ONCE
Status: COMPLETED | OUTPATIENT
Start: 2017-11-15 | End: 2017-11-15

## 2017-11-15 RX ORDER — KETOROLAC TROMETHAMINE 15 MG/ML
10 INJECTION, SOLUTION INTRAMUSCULAR; INTRAVENOUS ONCE
Status: COMPLETED | OUTPATIENT
Start: 2017-11-15 | End: 2017-11-15

## 2017-11-15 RX ORDER — TAMSULOSIN HYDROCHLORIDE 0.4 MG/1
1 CAPSULE ORAL NIGHTLY
Qty: 10 CAPSULE | Refills: 0 | Status: ON HOLD | OUTPATIENT
Start: 2017-11-15 | End: 2017-11-20

## 2017-11-15 RX ADMIN — KETOROLAC TROMETHAMINE 10 MG: 15 INJECTION, SOLUTION INTRAMUSCULAR; INTRAVENOUS at 13:51

## 2017-11-15 RX ADMIN — HYDROMORPHONE HYDROCHLORIDE 1 MG: 1 INJECTION, SOLUTION INTRAMUSCULAR; INTRAVENOUS; SUBCUTANEOUS at 13:50

## 2017-11-15 RX ADMIN — ONDANSETRON 4 MG: 2 INJECTION, SOLUTION INTRAMUSCULAR; INTRAVENOUS at 13:50

## 2017-11-15 RX ADMIN — LISINOPRIL 20 MG: 10 TABLET ORAL at 16:13

## 2017-11-15 NOTE — ED PROVIDER NOTES
Subjective   HPI Comments: Patient comes in with a complaint of right-sided flank and upper quadrant pain which she's been having intermittently since he was diagnosed with kidney stone about 2 weeks ago.  Patient has not passed the stone that he knows of and was seen this morning for pain and had laboratory and urinalysis done and was discharged home and comes back that he called his dad because he was almost doubled over with the pain 2 hours ago and dad brought him here and since then the pain has resolved.  Patient is feeling like he needs to urinate and he also testicular ultrasound done recently which was unremarkable.  Patient has not seen a urologist.  Patient had a kidney stone 3 years ago which required intervention and at that time the stone was about 11 mm in size    Patient is a 32 y.o. male presenting with flank pain.   Flank Pain   Pain location:  RUQ and R flank  Pain quality: sharp and throbbing    Pain radiates to:  Scrotum and groin  Pain severity:  No pain  Onset quality:  Sudden  Timing:  Sporadic  Progression:  Resolved  Chronicity:  Recurrent  Context: not alcohol use, not awakening from sleep, not diet changes, not eating, not laxative use, not medication withdrawal, not previous surgeries, not recent illness, not recent sexual activity, not recent travel, not retching, not sick contacts, not suspicious food intake and not trauma    Relieved by:  Nothing  Worsened by:  Nothing  Associated symptoms: no anorexia, no belching, no chest pain, no chills, no constipation, no cough, no diarrhea, no dysuria, no fatigue, no fever, no flatus, no hematemesis, no hematochezia, no hematuria, no melena, no nausea, no shortness of breath, no sore throat, no vaginal bleeding, no vaginal discharge and no vomiting    Risk factors: no alcohol abuse, no aspirin use, has not had multiple surgeries, no NSAID use and no recent hospitalization        Review of Systems   Constitutional: Negative for chills, fatigue  and fever.   HENT: Negative for sore throat.    Eyes: Negative.    Respiratory: Negative for cough and shortness of breath.    Cardiovascular: Negative for chest pain.   Gastrointestinal: Negative for anorexia, constipation, diarrhea, flatus, hematemesis, hematochezia, melena, nausea and vomiting.   Endocrine: Negative.    Genitourinary: Positive for flank pain. Negative for dysuria, hematuria, vaginal bleeding and vaginal discharge.   Neurological: Negative.    Hematological: Negative.    Psychiatric/Behavioral: Negative.    All other systems reviewed and are negative.      Past Medical History:   Diagnosis Date   • Hypertension    • Kidney stone        Allergies   Allergen Reactions   • Penicillins Anaphylaxis       Past Surgical History:   Procedure Laterality Date   • FL CYSTO URETHROSCOPY W REM FB     • MANDIBLE FRACTURE SURGERY         History reviewed. No pertinent family history.    Social History     Social History   • Marital status:      Spouse name: N/A   • Number of children: N/A   • Years of education: N/A     Social History Main Topics   • Smoking status: Current Some Day Smoker     Packs/day: 0.50     Types: Cigarettes   • Smokeless tobacco: Never Used   • Alcohol use No   • Drug use: No   • Sexual activity: Defer     Other Topics Concern   • None     Social History Narrative           Objective   Physical Exam   Constitutional: He is oriented to person, place, and time. He appears well-developed and well-nourished. No distress.   HENT:   Head: Normocephalic and atraumatic.   Eyes: Conjunctivae are normal. Pupils are equal, round, and reactive to light.   Neck: Neck supple.   Cardiovascular: Normal rate and regular rhythm.    No murmur heard.  Pulmonary/Chest: Effort normal.   Abdominal: Soft. Bowel sounds are normal. He exhibits no distension.   Genitourinary: Penis normal.   Musculoskeletal: He exhibits no edema.   Neurological: He is alert and oriented to person, place, and time.   Skin:  Skin is warm and dry.   Psychiatric: He has a normal mood and affect.   Nursing note and vitals reviewed.      Procedures         ED Course  ED Course        Labs Reviewed - No data to display    CT Abdomen Pelvis Without Contrast   Final Result   1. Right-sided nonobstructing nephrolithiasis unchanged from the   previous study. There is moderate obstructive uropathy on the right   secondary to a distal right ureteral stone measuring 5 mm in size   approximately 1 cm above the right ureterovesical junction. This is   unchanged in position from a previous study of 10/28/2017. There is no   evidence of left-sided nephrolithiasis, ureteral calculus or obstructive   uropathy.   2. Normal bowel gas pattern with no obstruction or free air. There is no   free fluid present..            This report was finalized on 11/15/2017 14:58 by Dr. Sy Luu MD.                  [facility]    Final diagnoses:   Renal colic on right side            Owen Escobar MD  11/15/17 1554

## 2017-11-16 NOTE — ED NOTES
"ED Call Back Questions    1. How are you doing since leaving the Emergency Department?    Doing ok  2. Do you have any questions about your discharge instructions? No     3. Have you filled your new prescriptions yet? Yes   a. Do you have any questions about those medications? No     4. Were you able to make a follow-up appointment with the physician? Yes     5. Do you have a primary care physician? Yes   a. If No, would you like for me to set you up with one? No   i. If Yes, “I will have our ED  give you a call right back at this number to work with you on the best time for an appointment.”    6. We are always looking to get better at what we do. Do you have any suggestions for what we can do to be even better? No   a. If Yes, \"Thank you for sharing your concerns. I apologize. I will follow up with our manager and patient . Would you like someone to call you back?\" No     7. Is there anything else I can do for you? No   Visit was fine     Terry Fritz  11/16/17 9322    "

## 2017-11-16 NOTE — ED NOTES
"ED Call Back Questions    1. How are you doing since leaving the Emergency Department?    Doing better, no pain at all  2. Do you have any questions about your discharge instructions? No     3. Have you filled your new prescriptions yet? N/A  a. Do you have any questions about those medications? No     4. Were you able to make a follow-up appointment with the physician? Yes     5. Do you have a primary care physician? Yes   a. If No, would you like for me to set you up with one? N/A  i. If Yes, “I will have our ED  give you a call right back at this number to work with you on the best time for an appointment.”    6. We are always looking to get better at what we do. Do you have any suggestions for what we can do to be even better? No   a. If Yes, \"Thank you for sharing your concerns. I apologize. I will follow up with our manager and patient . Would you like someone to call you back?\" No   Pt returned to ed this am and was given strainers to catch stone when passed  7. Is there anything else I can do for you? No   Everything went very well     Terry Fritz  11/16/17 0909    "

## 2017-11-17 ENCOUNTER — OFFICE VISIT (OUTPATIENT)
Dept: UROLOGY | Facility: CLINIC | Age: 32
End: 2017-11-17

## 2017-11-17 ENCOUNTER — APPOINTMENT (OUTPATIENT)
Dept: PREADMISSION TESTING | Facility: HOSPITAL | Age: 32
End: 2017-11-17

## 2017-11-17 ENCOUNTER — TELEPHONE (OUTPATIENT)
Dept: UROLOGY | Facility: CLINIC | Age: 32
End: 2017-11-17

## 2017-11-17 VITALS
WEIGHT: 188 LBS | BODY MASS INDEX: 27.85 KG/M2 | DIASTOLIC BLOOD PRESSURE: 98 MMHG | HEIGHT: 69 IN | TEMPERATURE: 97.6 F | SYSTOLIC BLOOD PRESSURE: 160 MMHG

## 2017-11-17 VITALS
SYSTOLIC BLOOD PRESSURE: 168 MMHG | HEIGHT: 69 IN | WEIGHT: 187.38 LBS | BODY MASS INDEX: 27.75 KG/M2 | HEART RATE: 110 BPM | RESPIRATION RATE: 13 BRPM | OXYGEN SATURATION: 99 % | DIASTOLIC BLOOD PRESSURE: 110 MMHG

## 2017-11-17 DIAGNOSIS — N50.819 TESTICULAR PAIN: Primary | ICD-10-CM

## 2017-11-17 DIAGNOSIS — N20.1 RIGHT URETERAL STONE: ICD-10-CM

## 2017-11-17 LAB
BILIRUB BLD-MCNC: NEGATIVE MG/DL
BILIRUB UR QL STRIP: NEGATIVE
CLARITY UR: CLEAR
CLARITY, POC: CLEAR
COLOR UR: YELLOW
COLOR UR: YELLOW
DEPRECATED RDW RBC AUTO: 43.2 FL (ref 40–54)
ERYTHROCYTE [DISTWIDTH] IN BLOOD BY AUTOMATED COUNT: 13.3 % (ref 12–15)
GLUCOSE UR STRIP-MCNC: NEGATIVE MG/DL
GLUCOSE UR STRIP-MCNC: NEGATIVE MG/DL
HCT VFR BLD AUTO: 44.6 % (ref 40–52)
HGB BLD-MCNC: 14.6 G/DL (ref 14–18)
HGB UR QL STRIP.AUTO: NEGATIVE
KETONES UR QL STRIP: NEGATIVE
KETONES UR QL: NEGATIVE
LEUKOCYTE EST, POC: NEGATIVE
LEUKOCYTE ESTERASE UR QL STRIP.AUTO: NEGATIVE
MCH RBC QN AUTO: 29.3 PG (ref 28–32)
MCHC RBC AUTO-ENTMCNC: 32.7 G/DL (ref 33–36)
MCV RBC AUTO: 89.6 FL (ref 82–95)
NITRITE UR QL STRIP: NEGATIVE
NITRITE UR-MCNC: NEGATIVE MG/ML
PH UR STRIP.AUTO: 6.5 [PH] (ref 5–8)
PH UR: 7 [PH] (ref 5–8)
PLATELET # BLD AUTO: 261 10*3/MM3 (ref 130–400)
PMV BLD AUTO: 9.8 FL (ref 6–12)
PROT UR QL STRIP: NEGATIVE
PROT UR STRIP-MCNC: NEGATIVE MG/DL
RBC # BLD AUTO: 4.98 10*6/MM3 (ref 4.8–5.9)
RBC # UR STRIP: ABNORMAL /UL
SP GR UR STRIP: 1.02 (ref 1–1.03)
SP GR UR: 1.02 (ref 1–1.03)
UROBILINOGEN UR QL STRIP: NORMAL
UROBILINOGEN UR QL: NORMAL
WBC NRBC COR # BLD: 6.82 10*3/MM3 (ref 4.8–10.8)

## 2017-11-17 PROCEDURE — 99204 OFFICE O/P NEW MOD 45 MIN: CPT | Performed by: UROLOGY

## 2017-11-17 PROCEDURE — 81003 URINALYSIS AUTO W/O SCOPE: CPT | Performed by: UROLOGY

## 2017-11-17 PROCEDURE — 85027 COMPLETE CBC AUTOMATED: CPT | Performed by: UROLOGY

## 2017-11-17 NOTE — TELEPHONE ENCOUNTER
Called and left voicemail for patient about chging his arrival time from 200 and 100pm arrive at [facility]. NPO after midnight.

## 2017-11-17 NOTE — PROGRESS NOTES
Mr. Berg is 32 y.o. male    Chief Complaint   Patient presents with   • Testicle Pain   • Flank Pain       Testicle Pain   The patient's primary symptoms include testicular pain. The patient's pertinent negatives include no genital injury. This is a new problem. The current episode started in the past 7 days. The problem occurs rarely. The problem has been resolved. The pain is medium. Associated symptoms include abdominal pain, chills and flank pain. Pertinent negatives include no chest pain, coughing, dysuria, fever, frequency, headaches, rash, shortness of breath, sore throat or urgency. The testicular pain affects the left testicle. The color of the testicles is normal. The symptoms are aggravated by tactile pressure. He has tried nothing for the symptoms. The treatment provided significant relief.   Flank Pain   This is a new problem. The current episode started 1 to 4 weeks ago. The problem occurs intermittently. The problem has been waxing and waning since onset. The pain is present in the lumbar spine. The pain is moderate. The pain is the same all the time. The symptoms are aggravated by position. Associated symptoms include abdominal pain. Pertinent negatives include no chest pain, dysuria, fever or headaches. Risk factors: history of stones. He has tried analgesics for the symptoms. The treatment provided mild relief.       The following portions of the patient's history were reviewed and updated as appropriate: allergies, current medications, past family history, past medical history, past social history, past surgical history and problem list.    Review of Systems   Constitutional: Positive for chills. Negative for fever.   HENT: Negative for congestion and sore throat.    Eyes: Negative for pain and itching.   Respiratory: Negative for cough and shortness of breath.    Cardiovascular: Negative for chest pain.   Gastrointestinal: Positive for abdominal pain. Negative for anal bleeding and blood in  stool.   Endocrine: Negative for cold intolerance and heat intolerance.   Genitourinary: Positive for difficulty urinating, flank pain and testicular pain. Negative for dysuria, frequency, hematuria and urgency.   Musculoskeletal: Negative for back pain and neck pain.   Skin: Negative for color change and rash.   Allergic/Immunologic: Negative for food allergies.   Neurological: Negative for dizziness and headaches.   Hematological: Does not bruise/bleed easily.   Psychiatric/Behavioral: Positive for sleep disturbance. Negative for confusion.         Current Outpatient Prescriptions:   •  Escitalopram Oxalate (LEXAPRO PO), Take  by mouth., Disp: , Rfl:   •  HYDROcodone-acetaminophen (NORCO) 5-325 MG per tablet, Take 1 tablet by mouth Every 6 (Six) Hours As Needed for Moderate Pain  or Severe Pain ., Disp: 10 tablet, Rfl: 0  •  lamoTRIgine (LaMICtal) 100 MG tablet, Take 100 mg by mouth Daily., Disp: , Rfl:   •  lisinopril (PRINIVIL,ZESTRIL) 10 MG tablet, Take 10 mg by mouth Daily., Disp: , Rfl:   •  Methylphenidate HCl ER (CONCERTA) 18 MG CR tablet, Take 36 mg by mouth Every Morning., Disp: , Rfl:   •  naproxen (NAPROSYN) 500 MG tablet, Take 1 tablet by mouth 2 (Two) Times a Day With Meals., Disp: 20 tablet, Rfl: 0  •  ondansetron ODT (ZOFRAN-ODT) 4 MG disintegrating tablet, Take 1 tablet by mouth Every 8 (Eight) Hours As Needed for Nausea or Vomiting., Disp: 10 tablet, Rfl: 0  •  tamsulosin (FLOMAX) 0.4 MG capsule 24 hr capsule, Take 1 capsule by mouth Every Night., Disp: 10 capsule, Rfl: 0    Past Medical History:   Diagnosis Date   • Hypertension    • Kidney stone        Past Surgical History:   Procedure Laterality Date   • FL CYSTO URETHROSCOPY W REM FB     • MANDIBLE FRACTURE SURGERY         Social History     Social History   • Marital status:      Spouse name: N/A   • Number of children: N/A   • Years of education: N/A     Social History Main Topics   • Smoking status: Current Some Day Smoker      "Packs/day: 0.50     Types: Cigarettes   • Smokeless tobacco: Never Used   • Alcohol use No   • Drug use: No   • Sexual activity: Defer     Other Topics Concern   • None     Social History Narrative       History reviewed. No pertinent family history.    Objective    /98  Temp 97.6 °F (36.4 °C)  Ht 69\" (175.3 cm)  Wt 188 lb (85.3 kg)  BMI 27.76 kg/m2    Physical Exam   Constitutional: He is oriented to person, place, and time. He appears well-developed and well-nourished. No distress.   HENT:   Head: Normocephalic and atraumatic.   Right Ear: External ear and ear canal normal.   Left Ear: External ear and ear canal normal.   Nose: No nasal deformity. No epistaxis.   Mouth/Throat: Oropharynx is clear and moist. Mucous membranes are not pale, not dry and not cyanotic. Normal dentition. No oropharyngeal exudate.   Neck: Trachea normal. No tracheal tenderness present. No tracheal deviation present. No thyroid mass and no thyromegaly present.   Pulmonary/Chest: Effort normal. No accessory muscle usage. No respiratory distress. Chest wall is not dull to percussion (No flatness or hyperresonance). He exhibits no mass and no tenderness.   On palpation, no tactile fremitus. All movements are symmetric. No intercostal retraction noted.    Abdominal: Soft. Normal appearance. He exhibits no distension and no mass. There is no hepatosplenomegaly. There is no tenderness. No hernia.   Rectal examination or stool specimen is not indicated.    Musculoskeletal:   Normal gait and station. The spine, ribs, and pelvis are examined. No obvious misalignment or asymmetry. ROM is reasonable for age. No instability. No obvious atrophy, flaccidity or spasticity.    Lymphadenopathy:     He has no cervical adenopathy.        Right: No inguinal adenopathy present.        Left: No inguinal adenopathy present.   Neurological: He is alert and oriented to person, place, and time.   Skin: Skin is warm, dry and intact. No lesion and no rash " noted. He is not diaphoretic. No cyanosis. No pallor. Nails show no clubbing.   On palpation, there were no induration, subcutaneous nodules, or tightening   Psychiatric: His speech is normal and behavior is normal. Judgment and thought content normal. His mood appears not anxious. His affect is not labile. He does not exhibit a depressed mood.   Vitals reviewed.      Admission on 11/15/2017, Discharged on 11/15/2017   Component Date Value Ref Range Status   • Glucose 11/15/2017 95  70 - 100 mg/dL Final   • BUN 11/15/2017 15  5 - 21 mg/dL Final   • Creatinine 11/15/2017 1.26  0.50 - 1.40 mg/dL Final   • Sodium 11/15/2017 140  135 - 145 mmol/L Final   • Potassium 11/15/2017 4.4  3.5 - 5.3 mmol/L Final   • Chloride 11/15/2017 95* 98 - 110 mmol/L Final   • CO2 11/15/2017 32.0* 24.0 - 31.0 mmol/L Final   • Calcium 11/15/2017 9.9  8.4 - 10.4 mg/dL Final   • Total Protein 11/15/2017 8.1  6.3 - 8.7 g/dL Final   • Albumin 11/15/2017 4.90  3.50 - 5.00 g/dL Final   • ALT (SGPT) 11/15/2017 67* 0 - 54 U/L Final   • AST (SGOT) 11/15/2017 47* 7 - 45 U/L Final   • Alkaline Phosphatase 11/15/2017 70  24 - 120 U/L Final   • Total Bilirubin 11/15/2017 0.4  0.1 - 1.0 mg/dL Final   • eGFR Non  Amer 11/15/2017 66  >60 mL/min/1.73 Final   • Globulin 11/15/2017 3.2  gm/dL Final   • A/G Ratio 11/15/2017 1.5  1.1 - 2.5 g/dL Final   • BUN/Creatinine Ratio 11/15/2017 11.9  7.0 - 25.0 Final   • Anion Gap 11/15/2017 13.0  4.0 - 13.0 mmol/L Final   • Color, UA 11/15/2017 Yellow  Yellow, Straw Final   • Appearance, UA 11/15/2017 Clear  Clear Final   • pH, UA 11/15/2017 6.5  5.0 - 8.0 Final   • Specific Gravity, UA 11/15/2017 1.005  1.005 - 1.030 Final   • Glucose, UA 11/15/2017 Negative  Negative Final   • Ketones, UA 11/15/2017 Negative  Negative Final   • Bilirubin, UA 11/15/2017 Negative  Negative Final   • Blood, UA 11/15/2017 Moderate (2+)* Negative Final   • Protein, UA 11/15/2017 Negative  Negative Final   • Leuk Esterase, UA  11/15/2017 Trace* Negative Final   • Nitrite, UA 11/15/2017 Negative  Negative Final   • Urobilinogen, UA 11/15/2017 0.2 E.U./dL  0.2 - 1.0 E.U./dL Final   • WBC 11/15/2017 13.47* 4.80 - 10.80 10*3/mm3 Final   • RBC 11/15/2017 5.17  4.80 - 5.90 10*6/mm3 Final   • Hemoglobin 11/15/2017 15.6  14.0 - 18.0 g/dL Final   • Hematocrit 11/15/2017 46.4  40.0 - 52.0 % Final   • MCV 11/15/2017 89.7  82.0 - 95.0 fL Final   • MCH 11/15/2017 30.2  28.0 - 32.0 pg Final   • MCHC 11/15/2017 33.6  33.0 - 36.0 g/dL Final   • RDW 11/15/2017 13.0  12.0 - 15.0 % Final   • RDW-SD 11/15/2017 42.6  40.0 - 54.0 fl Final   • MPV 11/15/2017 10.1  6.0 - 12.0 fL Final   • Platelets 11/15/2017 310  130 - 400 10*3/mm3 Final   • Neutrophil % 11/15/2017 71.6  39.0 - 78.0 % Final   • Lymphocyte % 11/15/2017 18.1  15.0 - 45.0 % Final   • Monocyte % 11/15/2017 7.1  4.0 - 12.0 % Final   • Eosinophil % 11/15/2017 2.6  0.0 - 4.0 % Final   • Basophil % 11/15/2017 0.4  0.0 - 2.0 % Final   • Immature Grans % 11/15/2017 0.2  0.0 - 5.0 % Final   • Neutrophils, Absolute 11/15/2017 9.63* 1.87 - 8.40 10*3/mm3 Final   • Lymphocytes, Absolute 11/15/2017 2.44  0.72 - 4.86 10*3/mm3 Final   • Monocytes, Absolute 11/15/2017 0.96  0.19 - 1.30 10*3/mm3 Final   • Eosinophils, Absolute 11/15/2017 0.35  0.00 - 0.70 10*3/mm3 Final   • Basophils, Absolute 11/15/2017 0.06  0.00 - 0.20 10*3/mm3 Final   • Immature Grans, Absolute 11/15/2017 0.03  0.00 - 0.03 10*3/mm3 Final   • nRBC 11/15/2017 0.0  0.0 - 0.0 /100 WBC Final   • RBC, UA 11/15/2017 3-5* None Seen /HPF Final   • WBC, UA 11/15/2017 0-2* None Seen /HPF Final   • Bacteria, UA 11/15/2017 None Seen  None Seen /HPF Final   • Squamous Epithelial Cells, UA 11/15/2017 None Seen  None Seen, 0-2 /HPF Final   • Methodology 11/15/2017 Automated Microscopy   Final       Results for orders placed or performed in visit on 11/17/17   POC Urinalysis Dipstick, Automated   Result Value Ref Range    Color Yellow Yellow, Straw, Dark  Yellow, Zoya    Clarity, UA Clear Clear    Glucose, UA Negative Negative, 1000 mg/dL (3+) mg/dL    Bilirubin Negative Negative    Ketones, UA Negative Negative    Specific Gravity  1.025 1.005 - 1.030    Blood, UA Trace (A) Negative    pH, Urine 7.0 5.0 - 8.0    Protein, POC Negative Negative mg/dL    Urobilinogen, UA Normal Normal    Leukocytes Negative Negative    Nitrite, UA Negative Negative     Assessment and Plan    Julien was seen today for testicle pain and flank pain.    Diagnoses and all orders for this visit:    Testicular pain  -     POC Urinalysis Dipstick, Automated    Right ureteral stone  -     Case Request; Standing  -     levoFLOXacin (LEVAQUIN) 500 mg in sodium chloride 0.9 % 150 mL IVPB; Infuse 500 mg into a venous catheter 1 (One) Time.  -     Case Request    Other orders  -     Follow Anesthesia Guidelines / Standing Orders; Future  -     Provide instructions to patient on NPO status  -     Obtain informed consent  -     Follow Anesthesia Guidelines / Standing Orders; Standing  -     Verify NPO Status; Standing  -     MOUNIKA hose- To be placed on patient in pre-op; Standing  I independently reviewed his scrotal ultrasound.  No abnormalities seen.  He did have a fleeting episode of left testicular pain.  Over this is likely benign and has completely resolved.  With regards to his flank pain, he has had 2 emergency room visits showing a distal 4-5 mm stone on the right.  We discussed options for treatment including a continued trial passage but at this point he would like surgical intervention.  We discussed options for this he is chosen ureteroscopy.  Plan for ureteroscopy on Monday.  He understands that he has fevers chills nausea vomiting or uncontrolled pain he should come to the emergency room over the weekend, and I am on call and I will be happy to treat him emergently over the weekend.    The patient has a right distal ureteral calculus as defined by symptoms and radiographic studies.   Options for the management of this stone are discussed based upon size, location, symptoms, and probable composition including watchful waiting, expulsive therapy, ESWL, ureteral stenting, percutaneous management, and open approaches.  Based upon this discussed, The patient has elected to proceed with ureteroscopic management of the stone.  Mr. Berg understands that a laser or other fragmentation aid may be needed.  The need for ureteral stenting and subsequent removal is also discussed.  Risks of bleeding, infection, damage to urethra or bladder, ureteral perforation or avulsion, pain, and post operative stent discomfort are all discussed.  I discussed the need for return follow up for stent removal, and that failure to do so could result in significant infection, further stone formation, need for surgical intervention to remove the stent, or permanent kidney damage.  All questions were answered to the patient's satifaction    CT independent review  The CT scan of the abdomen/pelvis done without contrast is available for me to review.  Treatment recommendations require an independent review.  First I scanned the liver, spleen, and bowel pattern.  The retroperitoneum including the major vessels and lymphatic packages are briefly reviewed.  This film as been reviewed by the radiologist to determine any non urologic abnormalities that are present.  The kidneys are closely inspected for size, symmetry, contour, parenchymal thickness, perinephric reaction, presence of calcifications, and intrarenal dilation of the collecting system.  The ureters are inspected for their course, caliber, and any calcifications.  The bladder is inspected for its thickness, size, and presence of any calcifications.  This scan shows:    The right kidney appears abnormal on this non contrasted CT scan.   Nonobstructing 1-2 mm stones.  Hydronephrosis and hydroureter to the level of a 5 mm distal stone    The left kidney appears normal on  this non-contrasted CT scan.  The renal parenchymal is norml in thickness.  There are no solid masses or cysts.  There is no hydronephrosis.  There are no stones.      The bladder appears normal on this non-contrasted CT scan.  The bladder appears normal in thickness.  There no masses or stones seen on this exam.

## 2017-11-18 ENCOUNTER — APPOINTMENT (OUTPATIENT)
Dept: GENERAL RADIOLOGY | Facility: HOSPITAL | Age: 32
End: 2017-11-18

## 2017-11-18 ENCOUNTER — HOSPITAL ENCOUNTER (EMERGENCY)
Facility: HOSPITAL | Age: 32
Discharge: HOME | End: 2017-11-18
Admitting: EMERGENCY MEDICINE

## 2017-11-18 VITALS
HEIGHT: 69 IN | WEIGHT: 184 LBS | TEMPERATURE: 98.4 F | OXYGEN SATURATION: 100 % | BODY MASS INDEX: 27.25 KG/M2 | HEART RATE: 80 BPM | RESPIRATION RATE: 20 BRPM | DIASTOLIC BLOOD PRESSURE: 96 MMHG | SYSTOLIC BLOOD PRESSURE: 127 MMHG

## 2017-11-18 DIAGNOSIS — I10 HYPERTENSION, UNSPECIFIED TYPE: Primary | ICD-10-CM

## 2017-11-18 LAB
ALBUMIN SERPL-MCNC: 4.1 G/DL (ref 3.5–5)
ALBUMIN/GLOB SERPL: 1.4 G/DL (ref 1.1–2.5)
ALP SERPL-CCNC: 61 U/L (ref 24–120)
ALT SERPL W P-5'-P-CCNC: 105 U/L (ref 0–54)
ANION GAP SERPL CALCULATED.3IONS-SCNC: 8 MMOL/L (ref 4–13)
APTT PPP: 25.2 SECONDS (ref 24.1–34.8)
AST SERPL-CCNC: 55 U/L (ref 7–45)
BACTERIA UR QL AUTO: ABNORMAL /HPF
BASOPHILS # BLD AUTO: 0.04 10*3/MM3 (ref 0–0.2)
BASOPHILS NFR BLD AUTO: 0.5 % (ref 0–2)
BILIRUB SERPL-MCNC: 0.2 MG/DL (ref 0.1–1)
BILIRUB UR QL STRIP: NEGATIVE
BUN BLD-MCNC: 11 MG/DL (ref 5–21)
BUN/CREAT SERPL: 11.1 (ref 7–25)
CALCIUM SPEC-SCNC: 9.6 MG/DL (ref 8.4–10.4)
CHLORIDE SERPL-SCNC: 100 MMOL/L (ref 98–110)
CLARITY UR: CLEAR
CO2 SERPL-SCNC: 32 MMOL/L (ref 24–31)
COLOR UR: YELLOW
CREAT BLD-MCNC: 0.99 MG/DL (ref 0.5–1.4)
CRP SERPL-MCNC: 0.66 MG/DL (ref 0–0.99)
DEPRECATED RDW RBC AUTO: 44.9 FL (ref 40–54)
EOSINOPHIL # BLD AUTO: 0.42 10*3/MM3 (ref 0–0.7)
EOSINOPHIL NFR BLD AUTO: 5.6 % (ref 0–4)
ERYTHROCYTE [DISTWIDTH] IN BLOOD BY AUTOMATED COUNT: 13.7 % (ref 12–15)
GFR SERPL CREATININE-BSD FRML MDRD: 87 ML/MIN/1.73
GLOBULIN UR ELPH-MCNC: 2.9 GM/DL
GLUCOSE BLD-MCNC: 86 MG/DL (ref 70–100)
GLUCOSE UR STRIP-MCNC: NEGATIVE MG/DL
HCT VFR BLD AUTO: 45.1 % (ref 40–52)
HGB BLD-MCNC: 15 G/DL (ref 14–18)
HGB UR QL STRIP.AUTO: NEGATIVE
HYALINE CASTS UR QL AUTO: ABNORMAL /LPF
IMM GRANULOCYTES # BLD: 0.02 10*3/MM3 (ref 0–0.03)
IMM GRANULOCYTES NFR BLD: 0.3 % (ref 0–5)
INR PPP: 0.81 (ref 0.91–1.09)
KETONES UR QL STRIP: NEGATIVE
LEUKOCYTE ESTERASE UR QL STRIP.AUTO: ABNORMAL
LYMPHOCYTES # BLD AUTO: 2.85 10*3/MM3 (ref 0.72–4.86)
LYMPHOCYTES NFR BLD AUTO: 38.3 % (ref 15–45)
MCH RBC QN AUTO: 29.9 PG (ref 28–32)
MCHC RBC AUTO-ENTMCNC: 33.3 G/DL (ref 33–36)
MCV RBC AUTO: 90 FL (ref 82–95)
MONOCYTES # BLD AUTO: 0.59 10*3/MM3 (ref 0.19–1.3)
MONOCYTES NFR BLD AUTO: 7.9 % (ref 4–12)
NEUTROPHILS # BLD AUTO: 3.52 10*3/MM3 (ref 1.87–8.4)
NEUTROPHILS NFR BLD AUTO: 47.4 % (ref 39–78)
NITRITE UR QL STRIP: NEGATIVE
NRBC BLD MANUAL-RTO: 0 /100 WBC (ref 0–0)
PH UR STRIP.AUTO: 5.5 [PH] (ref 5–8)
PLATELET # BLD AUTO: 279 10*3/MM3 (ref 130–400)
PMV BLD AUTO: 10.1 FL (ref 6–12)
POTASSIUM BLD-SCNC: 4.3 MMOL/L (ref 3.5–5.3)
PROT SERPL-MCNC: 7 G/DL (ref 6.3–8.7)
PROT UR QL STRIP: NEGATIVE
PROTHROMBIN TIME: 11.4 SECONDS (ref 11.9–14.6)
RBC # BLD AUTO: 5.01 10*6/MM3 (ref 4.8–5.9)
RBC # UR: ABNORMAL /HPF
REF LAB TEST METHOD: ABNORMAL
SODIUM BLD-SCNC: 140 MMOL/L (ref 135–145)
SP GR UR STRIP: 1.02 (ref 1–1.03)
SQUAMOUS #/AREA URNS HPF: ABNORMAL /HPF
TROPONIN I SERPL-MCNC: <0.012 NG/ML (ref 0–0.03)
UROBILINOGEN UR QL STRIP: ABNORMAL
WBC NRBC COR # BLD: 7.44 10*3/MM3 (ref 4.8–10.8)
WBC UR QL AUTO: ABNORMAL /HPF

## 2017-11-18 PROCEDURE — 84484 ASSAY OF TROPONIN QUANT: CPT | Performed by: NURSE PRACTITIONER

## 2017-11-18 PROCEDURE — 93005 ELECTROCARDIOGRAM TRACING: CPT | Performed by: NURSE PRACTITIONER

## 2017-11-18 PROCEDURE — 85610 PROTHROMBIN TIME: CPT | Performed by: NURSE PRACTITIONER

## 2017-11-18 PROCEDURE — 85730 THROMBOPLASTIN TIME PARTIAL: CPT | Performed by: NURSE PRACTITIONER

## 2017-11-18 PROCEDURE — 80053 COMPREHEN METABOLIC PANEL: CPT | Performed by: NURSE PRACTITIONER

## 2017-11-18 PROCEDURE — 71010 HC CHEST PA OR AP: CPT

## 2017-11-18 PROCEDURE — 85025 COMPLETE CBC W/AUTO DIFF WBC: CPT | Performed by: NURSE PRACTITIONER

## 2017-11-18 PROCEDURE — 96360 HYDRATION IV INFUSION INIT: CPT

## 2017-11-18 PROCEDURE — 99284 EMERGENCY DEPT VISIT MOD MDM: CPT

## 2017-11-18 PROCEDURE — 81001 URINALYSIS AUTO W/SCOPE: CPT | Performed by: NURSE PRACTITIONER

## 2017-11-18 PROCEDURE — 86140 C-REACTIVE PROTEIN: CPT | Performed by: NURSE PRACTITIONER

## 2017-11-18 PROCEDURE — 93010 ELECTROCARDIOGRAM REPORT: CPT | Performed by: INTERNAL MEDICINE

## 2017-11-18 RX ORDER — SODIUM CHLORIDE 0.9 % (FLUSH) 0.9 %
10 SYRINGE (ML) INJECTION AS NEEDED
Status: DISCONTINUED | OUTPATIENT
Start: 2017-11-18 | End: 2017-11-18 | Stop reason: HOSPADM

## 2017-11-18 RX ADMIN — SODIUM CHLORIDE 500 ML: 9 INJECTION, SOLUTION INTRAVENOUS at 16:21

## 2017-11-18 NOTE — ED PROVIDER NOTES
"Subjective   HPI Comments: Patient is a 32-year-old  male who presents ER today with complaint of high blood pressure.  Patient reports in the past week he has had an elevated blood pressure.  He normally takes 2 mg of lisinopril treat this.  The patient reports his blood pressure has been anywhere from 161 systolic over 100s.  The patient states that yesterday he was at a preop appointment.  He was told that his blood pressure was elevated at that time that he should see his primary care provider.  He states that he went to go see Dr. Harper over the office was closed.  The patient states he woke up this morning that his blood pressure was 160s over 110.  Patient states that he took lisinopril for this.  He states that he also took 50 mg of metoprolol with his father's.  The patient states that he is scheduled for a ureteroscopy due to a 45 mm distal stone on the right side for one day.  He has seen Dr. Gonzalez for this.  Patient has been seen in this ER 2 times earlier this week due to flank pain due to the kidney stone.  He states that this morning he was not having any pain when his blood pressure was elevated.  Patient states that he is having intermittent episodes of dizziness.  He describes as \"shock dizziness\".  Patient states that he has not had any dizziness chest pain or shortness of breath at this time.  At this time he presents ER today for further evaluation.    Patient is a 32 y.o. male presenting with hypertension.   History provided by:  Patient   used: No    Hypertension   Severity:  Mild  Onset quality:  Sudden  Duration:  1 week  Timing:  Intermittent  Progression:  Unchanged  Chronicity:  New  Time since last dose of antihypertensive:  5 hours  Notable PTA blood pressures:  160/110  Context: normal sodium, not caffeine, not drug abuse, not herbal remedies, not medication change, not noncompliance, not OTC medications used and not stress    Relieved by:  ACE inhibitors " and beta blockers  Worsened by:  Nothing  Ineffective treatments:  None tried  Associated symptoms: dizziness    Associated symptoms: no abdominal pain, no anxiety, no blurred vision, no chest pain, no confusion, no ear pain, no epistaxis, no fatigue, no fever, no headaches, no hematuria, no hypokalemia, no loss of consciousness, no nausea, no neck pain, no palpitations, no peripheral edema, no shortness of breath, no syncope, no tinnitus, not vomiting and no weakness    Risk factors: tobacco use    Risk factors: no alcohol use, no cardiac disease, no cocaine use, no decongestant use, no diabetes, no family history of hypertension, no kidney disease, no obesity, no prior aneurysm, no prior stroke and no PVD        Review of Systems   Constitutional: Negative for fatigue and fever.   HENT: Negative for ear pain, nosebleeds and tinnitus.    Eyes: Negative for blurred vision.   Respiratory: Negative for shortness of breath.    Cardiovascular: Negative for chest pain, palpitations and syncope.   Gastrointestinal: Negative for abdominal pain, nausea and vomiting.   Genitourinary: Negative for hematuria.   Musculoskeletal: Negative for neck pain.   Neurological: Positive for dizziness. Negative for loss of consciousness, weakness and headaches.   Psychiatric/Behavioral: Negative for confusion. The patient is not nervous/anxious.    All other systems reviewed and are negative.      Past Medical History:   Diagnosis Date   • Hypertension    • Kidney stone        Allergies   Allergen Reactions   • Penicillins Anaphylaxis       Past Surgical History:   Procedure Laterality Date   • FL CYSTO URETHROSCOPY W REM FB     • MANDIBLE FRACTURE SURGERY         Family History   Problem Relation Age of Onset   • Hypertension Mother    • Hypertension Father        Social History     Social History   • Marital status:      Spouse name: N/A   • Number of children: N/A   • Years of education: N/A     Social History Main Topics   •  Smoking status: Current Some Day Smoker     Packs/day: 0.50     Years: 20.00     Types: Cigarettes   • Smokeless tobacco: Never Used   • Alcohol use No   • Drug use: No   • Sexual activity: Defer     Other Topics Concern   • None     Social History Narrative           Objective   Physical Exam   Constitutional: He is oriented to person, place, and time. He appears well-developed and well-nourished.   HENT:   Head: Normocephalic and atraumatic.   Cardiovascular: Normal rate, regular rhythm and normal heart sounds.    Pulmonary/Chest: Effort normal and breath sounds normal.   Neurological: He is alert and oriented to person, place, and time.   Skin: Skin is warm and dry.   Psychiatric: He has a normal mood and affect.   Nursing note and vitals reviewed.      Procedures         ED Course  ED Course   Comment By Time   Patient's labs are reviewed.  They are unremarkable. Tania M Chris, APRN 11/18 1744   Patient's chest x-ray shows no acute findings. Tania M Chris, APRN 11/18 1744   The patient's blood pressure at this time is 126/94.  Patient is discharged home in stable condition. I have advised him of using medications that are not prescribed to him.  Going to go ahead and increase his lisinopril.  Advised him to start this tomorrow.  At this time the patient's advised follow-up primary care provider on Monday. He will be DC home in stable cond. Tania M Chris, APRN 11/18 1748   Patient became upset.  He was upset that he has been waiting in the ER.  When I talked to the patient and advised him of his lab EKG and x-ray results.  The patient was upset that we were not able to give him a answer as to why he has hypertension.  Patient reports to me that he has had hypertension his whole life and that no one has been able to answer.  He states that he came here today to get an answer for this.  I advised him that many many reasons why he has high blood pressure.Past follow-up primary care provider for further  evaluation.  Advised tohis that this could be due to just an unspecified essential hypertension.  Patient Understands.  Advised to Follow-Up Dr. Harper on Monday to Discuss Adjusting His Medication.  I Have Advised Him against Using Medications That Are Not Prescribed to Him.  At This Time He Will Be Discharged Home in Stable Condition. Tania Perez, APRN 11/18 1756        XR Chest 1 View   Final Result   1. No acute cardiopulmonary process.       This report was finalized on 11/18/2017 16:07 by Dr. Lester Gibson MD.        Lab Results (last 24 hours)     Procedure Component Value Units Date/Time    Urinalysis With / Culture If Indicated - Urine, Clean Catch [[id number]]  (Abnormal) Collected:  11/18/17 1606    Specimen:  Urine from Urine, Clean Catch Updated:  11/18/17 1637     Color, UA Yellow     Appearance, UA Clear     pH, UA 5.5     Specific Gravity, UA 1.016     Glucose, UA Negative     Ketones, UA Negative     Bilirubin, UA Negative     Blood, UA Negative     Protein, UA Negative     Leuk Esterase, UA Trace (A)     Nitrite, UA Negative     Urobilinogen, UA 0.2 E.U./dL    Urinalysis, Microscopic Only - Urine, Clean Catch [[id number]]  (Abnormal) Collected:  11/18/17 1606    Specimen:  Urine from Urine, Clean Catch Updated:  11/18/17 1637     RBC, UA 0-2 (A) /HPF      WBC, UA 3-5 (A) /HPF      Bacteria, UA None Seen /HPF      Squamous Epithelial Cells, UA None Seen /HPF      Hyaline Casts, UA None Seen /LPF      Methodology Automated Microscopy    CBC & Differential [[id number]] Collected:  11/18/17 1620    Specimen:  Blood Updated:  11/18/17 1643    Narrative:       The following orders were created for panel order CBC & Differential.  Procedure                               Abnormality         Status                     ---------                               -----------         ------                     Scan Slide[[id number]]                                                                  CBC Auto  Differential[[id number]]        Abnormal            Final result                 Please view results for these tests on the individual orders.    Comprehensive Metabolic Panel [[id number]]  (Abnormal) Collected:  11/18/17 1620    Specimen:  Blood Updated:  11/18/17 1650     Glucose 86 mg/dL      BUN 11 mg/dL      Creatinine 0.99 mg/dL      Sodium 140 mmol/L      Potassium 4.3 mmol/L      Chloride 100 mmol/L      CO2 32.0 (H) mmol/L      Calcium 9.6 mg/dL      Total Protein 7.0 g/dL      Albumin 4.10 g/dL      ALT (SGPT) 105 (H) U/L      AST (SGOT) 55 (H) U/L      Alkaline Phosphatase 61 U/L      Total Bilirubin 0.2 mg/dL      eGFR Non African Amer 87 mL/min/1.73      Globulin 2.9 gm/dL      A/G Ratio 1.4 g/dL      BUN/Creatinine Ratio 11.1     Anion Gap 8.0 mmol/L     aPTT [[id number]]  (Normal) Collected:  11/18/17 1620    Specimen:  Blood Updated:  11/18/17 1642     PTT 25.2 seconds     Protime-INR [[id number]]  (Abnormal) Collected:  11/18/17 1620    Specimen:  Blood Updated:  11/18/17 1642     Protime 11.4 (L) Seconds      INR 0.81 (L)    Troponin [[id number]]  (Normal) Collected:  11/18/17 1620    Specimen:  Blood Updated:  11/18/17 1659     Troponin I <0.012 ng/mL     C-reactive Protein [[id number]]  (Normal) Collected:  11/18/17 1620    Specimen:  Blood Updated:  11/18/17 1650     C-Reactive Protein 0.66 mg/dL     CBC Auto Differential [[id number]]  (Abnormal) Collected:  11/18/17 1620    Specimen:  Blood Updated:  11/18/17 1643     WBC 7.44 10*3/mm3      RBC 5.01 10*6/mm3      Hemoglobin 15.0 g/dL      Hematocrit 45.1 %      MCV 90.0 fL      MCH 29.9 pg      MCHC 33.3 g/dL      RDW 13.7 %      RDW-SD 44.9 fl      MPV 10.1 fL      Platelets 279 10*3/mm3      Neutrophil % 47.4 %      Lymphocyte % 38.3 %      Monocyte % 7.9 %      Eosinophil % 5.6 (H) %      Basophil % 0.5 %      Immature Grans % 0.3 %      Neutrophils, Absolute 3.52 10*3/mm3      Lymphocytes, Absolute 2.85 10*3/mm3      Monocytes, Absolute 0.59  10*3/mm3      Eosinophils, Absolute 0.42 10*3/mm3      Basophils, Absolute 0.04 10*3/mm3      Immature Grans, Absolute 0.02 10*3/mm3      nRBC 0.0 /100 WBC                   MDM  Number of Diagnoses or Management Options  Hypertension, unspecified type: established and worsening     Amount and/or Complexity of Data Reviewed  Clinical lab tests: ordered and reviewed  Tests in the radiology section of CPT®: ordered and reviewed  Tests in the medicine section of CPT®: ordered and reviewed  Decide to obtain previous medical records or to obtain history from someone other than the patient: yes  Discuss the patient with other providers: yes    Patient Progress  Patient progress: stable      Final diagnoses:   Hypertension, unspecified type            Tania Perez, APRN  11/18/17 7069

## 2017-11-18 NOTE — DISCHARGE INSTRUCTIONS
Please f/u with Dr. Harper on Monday for blood pressure recheck and to discuss adjusting your blood pressure medications  R/t to the ER as needed

## 2017-11-20 ENCOUNTER — ANESTHESIA (OUTPATIENT)
Dept: PERIOP | Facility: HOSPITAL | Age: 32
End: 2017-11-20

## 2017-11-20 ENCOUNTER — APPOINTMENT (OUTPATIENT)
Dept: GENERAL RADIOLOGY | Facility: HOSPITAL | Age: 32
End: 2017-11-20

## 2017-11-20 ENCOUNTER — HOSPITAL ENCOUNTER (OUTPATIENT)
Facility: HOSPITAL | Age: 32
Setting detail: HOSPITAL OUTPATIENT SURGERY
Discharge: HOME | End: 2017-11-20
Attending: UROLOGY | Admitting: UROLOGY

## 2017-11-20 ENCOUNTER — ANESTHESIA EVENT (OUTPATIENT)
Dept: PERIOP | Facility: HOSPITAL | Age: 32
End: 2017-11-20

## 2017-11-20 VITALS
TEMPERATURE: 97.8 F | DIASTOLIC BLOOD PRESSURE: 106 MMHG | OXYGEN SATURATION: 97 % | SYSTOLIC BLOOD PRESSURE: 148 MMHG | HEART RATE: 96 BPM | RESPIRATION RATE: 20 BRPM

## 2017-11-20 DIAGNOSIS — N20.1 RIGHT URETERAL STONE: ICD-10-CM

## 2017-11-20 PROCEDURE — 88300 SURGICAL PATH GROSS: CPT | Performed by: UROLOGY

## 2017-11-20 PROCEDURE — 25010000002 MIDAZOLAM PER 1 MG: Performed by: ANESTHESIOLOGY

## 2017-11-20 PROCEDURE — 0 IOPAMIDOL 61 % SOLUTION: Performed by: UROLOGY

## 2017-11-20 PROCEDURE — C1769 GUIDE WIRE: HCPCS | Performed by: UROLOGY

## 2017-11-20 PROCEDURE — C1758 CATHETER, URETERAL: HCPCS | Performed by: UROLOGY

## 2017-11-20 PROCEDURE — 52352 CYSTOURETERO W/STONE REMOVE: CPT | Performed by: UROLOGY

## 2017-11-20 PROCEDURE — 25010000002 FENTANYL CITRATE (PF) 100 MCG/2ML SOLUTION: Performed by: NURSE ANESTHETIST, CERTIFIED REGISTERED

## 2017-11-20 PROCEDURE — 82360 CALCULUS ASSAY QUANT: CPT | Performed by: UROLOGY

## 2017-11-20 PROCEDURE — 74420 UROGRAPHY RTRGR +-KUB: CPT | Performed by: UROLOGY

## 2017-11-20 PROCEDURE — 25010000002 HYDROMORPHONE PER 4 MG: Performed by: ANESTHESIOLOGY

## 2017-11-20 PROCEDURE — 25010000002 LEVOFLOXACIN PER 250 MG: Performed by: UROLOGY

## 2017-11-20 PROCEDURE — C1894 INTRO/SHEATH, NON-LASER: HCPCS | Performed by: UROLOGY

## 2017-11-20 PROCEDURE — 25010000002 ONDANSETRON PER 1 MG: Performed by: NURSE ANESTHETIST, CERTIFIED REGISTERED

## 2017-11-20 PROCEDURE — C2617 STENT, NON-COR, TEM W/O DEL: HCPCS | Performed by: UROLOGY

## 2017-11-20 PROCEDURE — 74420 UROGRAPHY RTRGR +-KUB: CPT

## 2017-11-20 PROCEDURE — 25010000002 DEXAMETHASONE PER 1 MG: Performed by: NURSE ANESTHETIST, CERTIFIED REGISTERED

## 2017-11-20 PROCEDURE — 25010000002 PROPOFOL 10 MG/ML EMULSION: Performed by: NURSE ANESTHETIST, CERTIFIED REGISTERED

## 2017-11-20 PROCEDURE — 52356 CYSTO/URETERO W/LITHOTRIPSY: CPT | Performed by: UROLOGY

## 2017-11-20 DEVICE — URETERAL STENT
Type: IMPLANTABLE DEVICE | Site: URETER | Status: FUNCTIONAL
Brand: PERCUFLEX™ PLUS

## 2017-11-20 RX ORDER — METOCLOPRAMIDE HYDROCHLORIDE 5 MG/ML
5 INJECTION INTRAMUSCULAR; INTRAVENOUS
Status: DISCONTINUED | OUTPATIENT
Start: 2017-11-20 | End: 2017-11-20 | Stop reason: HOSPADM

## 2017-11-20 RX ORDER — NALOXONE HCL 0.4 MG/ML
0.04 VIAL (ML) INJECTION AS NEEDED
Status: DISCONTINUED | OUTPATIENT
Start: 2017-11-20 | End: 2017-11-20 | Stop reason: HOSPADM

## 2017-11-20 RX ORDER — SODIUM CHLORIDE 0.9 % (FLUSH) 0.9 %
3 SYRINGE (ML) INJECTION AS NEEDED
Status: DISCONTINUED | OUTPATIENT
Start: 2017-11-20 | End: 2017-11-20 | Stop reason: HOSPADM

## 2017-11-20 RX ORDER — MIDAZOLAM HYDROCHLORIDE 1 MG/ML
2 INJECTION INTRAMUSCULAR; INTRAVENOUS
Status: DISCONTINUED | OUTPATIENT
Start: 2017-11-20 | End: 2017-11-20 | Stop reason: HOSPADM

## 2017-11-20 RX ORDER — FENTANYL CITRATE 50 UG/ML
INJECTION, SOLUTION INTRAMUSCULAR; INTRAVENOUS AS NEEDED
Status: DISCONTINUED | OUTPATIENT
Start: 2017-11-20 | End: 2017-11-20 | Stop reason: SURG

## 2017-11-20 RX ORDER — MIDAZOLAM HYDROCHLORIDE 1 MG/ML
1 INJECTION INTRAMUSCULAR; INTRAVENOUS
Status: DISCONTINUED | OUTPATIENT
Start: 2017-11-20 | End: 2017-11-20 | Stop reason: HOSPADM

## 2017-11-20 RX ORDER — TAMSULOSIN HYDROCHLORIDE 0.4 MG/1
1 CAPSULE ORAL NIGHTLY
Qty: 14 CAPSULE | Refills: 0 | Status: SHIPPED | OUTPATIENT
Start: 2017-11-20

## 2017-11-20 RX ORDER — PHENAZOPYRIDINE HYDROCHLORIDE 100 MG/1
100 TABLET, FILM COATED ORAL 3 TIMES DAILY PRN
Qty: 12 TABLET | Refills: 0 | Status: SHIPPED | OUTPATIENT
Start: 2017-11-20 | End: 2017-11-26 | Stop reason: HOSPADM

## 2017-11-20 RX ORDER — PROPOFOL 10 MG/ML
VIAL (ML) INTRAVENOUS AS NEEDED
Status: DISCONTINUED | OUTPATIENT
Start: 2017-11-20 | End: 2017-11-20 | Stop reason: SURG

## 2017-11-20 RX ORDER — SODIUM CHLORIDE, SODIUM LACTATE, POTASSIUM CHLORIDE, CALCIUM CHLORIDE 600; 310; 30; 20 MG/100ML; MG/100ML; MG/100ML; MG/100ML
100 INJECTION, SOLUTION INTRAVENOUS CONTINUOUS
Status: DISCONTINUED | OUTPATIENT
Start: 2017-11-20 | End: 2017-11-20 | Stop reason: HOSPADM

## 2017-11-20 RX ORDER — DEXAMETHASONE SODIUM PHOSPHATE 4 MG/ML
INJECTION, SOLUTION INTRA-ARTICULAR; INTRALESIONAL; INTRAMUSCULAR; INTRAVENOUS; SOFT TISSUE AS NEEDED
Status: DISCONTINUED | OUTPATIENT
Start: 2017-11-20 | End: 2017-11-20 | Stop reason: SURG

## 2017-11-20 RX ORDER — MAGNESIUM HYDROXIDE 1200 MG/15ML
LIQUID ORAL AS NEEDED
Status: DISCONTINUED | OUTPATIENT
Start: 2017-11-20 | End: 2017-11-20 | Stop reason: HOSPADM

## 2017-11-20 RX ORDER — ONDANSETRON 2 MG/ML
4 INJECTION INTRAMUSCULAR; INTRAVENOUS ONCE AS NEEDED
Status: DISCONTINUED | OUTPATIENT
Start: 2017-11-20 | End: 2017-11-20 | Stop reason: HOSPADM

## 2017-11-20 RX ORDER — MEPERIDINE HYDROCHLORIDE 25 MG/ML
12.5 INJECTION INTRAMUSCULAR; INTRAVENOUS; SUBCUTANEOUS
Status: DISCONTINUED | OUTPATIENT
Start: 2017-11-20 | End: 2017-11-20 | Stop reason: HOSPADM

## 2017-11-20 RX ORDER — ONDANSETRON 2 MG/ML
4 INJECTION INTRAMUSCULAR; INTRAVENOUS AS NEEDED
Status: DISCONTINUED | OUTPATIENT
Start: 2017-11-20 | End: 2017-11-20 | Stop reason: HOSPADM

## 2017-11-20 RX ORDER — FLUMAZENIL 0.1 MG/ML
0.2 INJECTION INTRAVENOUS AS NEEDED
Status: DISCONTINUED | OUTPATIENT
Start: 2017-11-20 | End: 2017-11-20 | Stop reason: HOSPADM

## 2017-11-20 RX ORDER — MORPHINE SULFATE 2 MG/ML
2 INJECTION, SOLUTION INTRAMUSCULAR; INTRAVENOUS AS NEEDED
Status: DISCONTINUED | OUTPATIENT
Start: 2017-11-20 | End: 2017-11-20 | Stop reason: HOSPADM

## 2017-11-20 RX ORDER — IPRATROPIUM BROMIDE AND ALBUTEROL SULFATE 2.5; .5 MG/3ML; MG/3ML
3 SOLUTION RESPIRATORY (INHALATION) ONCE AS NEEDED
Status: DISCONTINUED | OUTPATIENT
Start: 2017-11-20 | End: 2017-11-20 | Stop reason: HOSPADM

## 2017-11-20 RX ORDER — LABETALOL HYDROCHLORIDE 5 MG/ML
5 INJECTION, SOLUTION INTRAVENOUS
Status: DISCONTINUED | OUTPATIENT
Start: 2017-11-20 | End: 2017-11-20 | Stop reason: HOSPADM

## 2017-11-20 RX ORDER — SODIUM CHLORIDE, SODIUM LACTATE, POTASSIUM CHLORIDE, CALCIUM CHLORIDE 600; 310; 30; 20 MG/100ML; MG/100ML; MG/100ML; MG/100ML
1000 INJECTION, SOLUTION INTRAVENOUS CONTINUOUS
Status: DISCONTINUED | OUTPATIENT
Start: 2017-11-20 | End: 2017-11-20 | Stop reason: HOSPADM

## 2017-11-20 RX ORDER — ONDANSETRON 2 MG/ML
INJECTION INTRAMUSCULAR; INTRAVENOUS AS NEEDED
Status: DISCONTINUED | OUTPATIENT
Start: 2017-11-20 | End: 2017-11-20 | Stop reason: SURG

## 2017-11-20 RX ORDER — CIPROFLOXACIN 500 MG/1
500 TABLET, FILM COATED ORAL 2 TIMES DAILY
Qty: 6 TABLET | Refills: 0 | Status: SHIPPED | OUTPATIENT
Start: 2017-11-20 | End: 2017-11-26 | Stop reason: HOSPADM

## 2017-11-20 RX ORDER — LIDOCAINE HYDROCHLORIDE 20 MG/ML
INJECTION, SOLUTION INFILTRATION; PERINEURAL AS NEEDED
Status: DISCONTINUED | OUTPATIENT
Start: 2017-11-20 | End: 2017-11-20 | Stop reason: SURG

## 2017-11-20 RX ORDER — HYDRALAZINE HYDROCHLORIDE 20 MG/ML
5 INJECTION INTRAMUSCULAR; INTRAVENOUS
Status: DISCONTINUED | OUTPATIENT
Start: 2017-11-20 | End: 2017-11-20 | Stop reason: HOSPADM

## 2017-11-20 RX ORDER — HYDROCODONE BITARTRATE AND ACETAMINOPHEN 5; 325 MG/1; MG/1
1 TABLET ORAL EVERY 4 HOURS PRN
Qty: 18 TABLET | Refills: 0 | Status: ON HOLD | OUTPATIENT
Start: 2017-11-20 | End: 2017-11-26

## 2017-11-20 RX ORDER — SODIUM CHLORIDE 0.9 % (FLUSH) 0.9 %
1-10 SYRINGE (ML) INJECTION AS NEEDED
Status: DISCONTINUED | OUTPATIENT
Start: 2017-11-20 | End: 2017-11-20 | Stop reason: HOSPADM

## 2017-11-20 RX ORDER — OXYCODONE HYDROCHLORIDE AND ACETAMINOPHEN 5; 325 MG/1; MG/1
1 TABLET ORAL ONCE AS NEEDED
Status: DISCONTINUED | OUTPATIENT
Start: 2017-11-20 | End: 2017-11-20 | Stop reason: HOSPADM

## 2017-11-20 RX ORDER — LEVOFLOXACIN 5 MG/ML
500 INJECTION, SOLUTION INTRAVENOUS ONCE
Status: COMPLETED | OUTPATIENT
Start: 2017-11-20 | End: 2017-11-20

## 2017-11-20 RX ADMIN — SODIUM CHLORIDE, POTASSIUM CHLORIDE, SODIUM LACTATE AND CALCIUM CHLORIDE 1000 ML: 600; 310; 30; 20 INJECTION, SOLUTION INTRAVENOUS at 13:29

## 2017-11-20 RX ADMIN — LIDOCAINE HYDROCHLORIDE 100 MG: 20 INJECTION, SOLUTION INFILTRATION; PERINEURAL at 16:37

## 2017-11-20 RX ADMIN — FENTANYL CITRATE 100 MCG: 50 INJECTION, SOLUTION INTRAMUSCULAR; INTRAVENOUS at 16:35

## 2017-11-20 RX ADMIN — LEVOFLOXACIN 500 MG: 5 INJECTION, SOLUTION INTRAVENOUS at 16:32

## 2017-11-20 RX ADMIN — HYDROMORPHONE HYDROCHLORIDE 0.5 MG: 1 INJECTION, SOLUTION INTRAMUSCULAR; INTRAVENOUS; SUBCUTANEOUS at 17:39

## 2017-11-20 RX ADMIN — HYDROMORPHONE HYDROCHLORIDE 0.5 MG: 1 INJECTION, SOLUTION INTRAMUSCULAR; INTRAVENOUS; SUBCUTANEOUS at 17:44

## 2017-11-20 RX ADMIN — MIDAZOLAM HYDROCHLORIDE 2 MG: 1 INJECTION, SOLUTION INTRAMUSCULAR; INTRAVENOUS at 16:31

## 2017-11-20 RX ADMIN — PROPOFOL 150 MG: 10 INJECTION, EMULSION INTRAVENOUS at 16:37

## 2017-11-20 RX ADMIN — DEXAMETHASONE SODIUM PHOSPHATE 4 MG: 4 INJECTION, SOLUTION INTRAMUSCULAR; INTRAVENOUS at 16:41

## 2017-11-20 RX ADMIN — ONDANSETRON HYDROCHLORIDE 4 MG: 2 SOLUTION INTRAMUSCULAR; INTRAVENOUS at 16:41

## 2017-11-20 NOTE — PLAN OF CARE
Problem: Patient Care Overview (Adult)  Goal: Plan of Care Review  Outcome: Ongoing (interventions implemented as appropriate)    11/20/17 8731   Coping/Psychosocial Response Interventions   Plan Of Care Reviewed With patient   Patient Care Overview   Progress no change         Problem: Perioperative Period (Adult)  Goal: Signs and Symptoms of Listed Potential Problems Will be Absent or Manageable (Perioperative Period)  Outcome: Ongoing (interventions implemented as appropriate)

## 2017-11-20 NOTE — ANESTHESIA PROCEDURE NOTES
Airway  Urgency: elective    Airway not difficult    General Information and Staff    Patient location during procedure: OR  CRNA: MARIA DE JESUS MENSAH    Indications and Patient Condition  Indications for airway management: airway protection    Preoxygenated: yes  Mask difficulty assessment: 0 - not attempted    Final Airway Details  Final airway type: supraglottic airway      Successful airway: classic  Size 4  Airway Seal Pressure (cm H2O): 15    Number of attempts at approach: 1

## 2017-11-20 NOTE — OP NOTE
URETEROSCOPY LASER LITHOTRIPSY WITH STENT INSERTION  Procedure Note    Julien Berg  11/20/2017    Pre-op Diagnosis:   Right ureteral stone [N20.1]    Post-op Diagnosis:     Post-Op Diagnosis Codes:     * Right ureteral stone [N20.1]    Procedure/CPT® Codes:      Procedure(s):  CYSTO URETEROSCOPY LASER LITHOTRIPSY WITH STENT INSERTION RETROGRADE PYELOGRAM STONE EXTRACTION, retrograde pyelogram    Surgeon(s):  Louis Gonzalez MD    Anesthesia: General    Staff:   Circulator: Autumn Castro RN  Scrub Person: Leatha Jerome  Other: Wong Olea    Indications for procedure:  32-year-old male with distal obstructing stone with multiple recurrent trips to the emergency room who is failed a trial passage and is chosen ureteroscopy as definitive treatment.    Procedure details:  Patient identified in the preoperative anesthesia care unit.  He was then brought back the operating and after induction of general anesthetic a proper timeout was performed.  After all were in agreement of patient procedure patient was prepped and draped in normal sterile fashion.  22 Serbian cystoscope was used per urethra anterior and posterior urethra within normal limits.  Cystoscopy revealed no lesions in the bladder.  Ureteral orifices in orthotopic position.  I intubated the right ureteral orifice with a cone-tipped catheter and retrograde pyelograms performed which we dictated later portions procedure.  I then intubated the right ureteral orifice with a sensor wire which was seen to coil fluoroscopically level kidney.  I then intubated the right ureteral orifice with a rigid ureteroscope.  A formal ureteroscopy up to the level of the vessels and did see the stone had migrated proximally.  This was removed at a difficult position for rigid ureteroscopy and therefore I placed a wire through the rigid ureteroscope and removed the uteroscope.  I then over the existing wire placed a 1214 ureteral access sheath up to the level of  the vessels.  I then performed flexible ureteroscopy and encountered the stone just proximal to the vessels.  I used a 200 µ laser to bust the stone in multiple small fragments.  I then used a 1.9 Yemeni 0 tip nitinol basket to remove the stone fragments.  I then perform ureteroscopy up to the level of the kidney and saw no other stone fragment.  I removed the ureteral access sheath under direct visualization saw no injury to the ureter itself or any stone fragments.  Over the existing wire I placed a 6 Yemeni by 24 cm double-J ureteral stent.  The wire was removed and the string was left in place and a good coil was seen fluoroscopically at the level kidney visual and the bladder.  At this point the bladder is emptied and the string was affixed to the patient's penis.    Right retrograde pyelogram read:  Contrast was injected per cone-tip catheter.  This showed a normal caliber very distal ureter, with a filling defect approximately 1 cm above the UVJ with dilated ureter and hydronephrosis beyond this.    Estimated Blood Loss: minimal    Specimens:                  ID Type Source Tests Collected by Time Destination   A : right ureteral stone Calculus Ureter, Right TISSUE EXAM Louis Gonzalez MD 11/20/2017 1702          Drains:           Complications: none    Follow up:   Patient will follow-up Monday for nurse visit for stent removal.  He will see me back in 6 weeks with a renal ultrasound.  Because of the holiday weekend patient was given the option to remove the stent himself at home at this point patient is undecided.  He does remove the stent at home himself he will cancel his Monday nurse appointment and see back in 6 weeks.    Louis Gonzalez MD     Date: 11/20/2017  Time: 5:19 PM

## 2017-11-20 NOTE — ANESTHESIA PREPROCEDURE EVALUATION
Anesthesia Evaluation     no history of anesthetic complications:  NPO Solid Status: > 8 hours  NPO Liquid Status: > 8 hours     Airway   Mallampati: I  TM distance: >3 FB  Neck ROM: full  no difficulty expected  Dental - normal exam     Pulmonary - normal exam    breath sounds clear to auscultation  (+) a smoker (1 ppd) Current,   (-) asthma, recent URI, sleep apnea  Cardiovascular - normal exam  Exercise tolerance: excellent (>7 METS)    ECG reviewed  Rhythm: regular  Rate: normal    (+) hypertension well controlled,   (-) pacemaker, past MI, angina, cardiac stents      Neuro/Psych  (-) seizures, TIA, CVA  GI/Hepatic/Renal/Endo    (-) GERD, liver disease, no renal disease, diabetes, hypothyroidism, hyperthyroidism    Musculoskeletal     Abdominal    Substance History      OB/GYN          Other                                      Anesthesia Plan    ASA 2     general     intravenous induction   Anesthetic plan and risks discussed with patient.

## 2017-11-21 ENCOUNTER — TELEPHONE (OUTPATIENT)
Dept: UROLOGY | Facility: CLINIC | Age: 32
End: 2017-11-21

## 2017-11-21 NOTE — DISCHARGE INSTRUCTIONS

## 2017-11-21 NOTE — ANESTHESIA POSTPROCEDURE EVALUATION
Patient: Julien Berg    Procedure Summary     Date Anesthesia Start Anesthesia Stop Room / Location    11/20/17 1634 1722 [facility] OR 01 / [facility]       Procedure Diagnosis Surgeon Provider    CYSTO URETEROSCOPY LASER LITHOTRIPSY WITH STENT INSERTION RETROGRADE PYELOGRAM STONE EXTRACTION (Right Ureter) Right ureteral stone  (Right ureteral stone [N20.1]) MD Horacio Marrero, JACQUI          Anesthesia Type: general  Last vitals  BP   (!) 148/106 (11/20/17 1906)   Temp   97.8 °F (36.6 °C) (11/20/17 1720)   Pulse   96 (11/20/17 1906)   Resp   20 (11/20/17 1906)     SpO2   97 % (11/20/17 1906)     Post Anesthesia Care and Evaluation      Comments: Patient discharged from PACU prior to anesthesia evaluation based on Mary Score.  For details, see RN note.     BP (!) 148/106  Pulse 96  Temp 97.8 °F (36.6 °C) (Temporal Artery )   Resp 20  SpO2 97%

## 2017-11-21 NOTE — PLAN OF CARE
Problem: Patient Care Overview (Adult)  Goal: Plan of Care Review  Outcome: Outcome(s) achieved Date Met:  11/20/17 11/20/17 1921   Coping/Psychosocial Response Interventions   Plan Of Care Reviewed With patient;family   Patient Care Overview   Progress improving   Outcome Evaluation   Outcome Summary/Follow up Plan PT MEETS DISCHARGE CRITERIA, ENCOURAGED PT TO GO TO PRIMARY DOCTOR ASAP TO HELP WITH HIGH BP, ALSO TO RECORD DAILY AT HOME AND REPORT TO MD         Problem: Perioperative Period (Adult)  Goal: Signs and Symptoms of Listed Potential Problems Will be Absent or Manageable (Perioperative Period)  Outcome: Outcome(s) achieved Date Met:  11/20/17

## 2017-11-21 NOTE — TELEPHONE ENCOUNTER
Patient is uncomfortable with stent in.  Doesn't think he can make it until Monday to have stent removed.      Wants to know if any way he can remove it before Monday or any advise for him for comfort with it?

## 2017-11-21 NOTE — PLAN OF CARE
Problem: Patient Care Overview (Adult)  Goal: Plan of Care Review  Outcome: Ongoing (interventions implemented as appropriate)    11/20/17 9624   Coping/Psychosocial Response Interventions   Plan Of Care Reviewed With patient   Patient Care Overview   Progress improving   Outcome Evaluation   Outcome Summary/Follow up Plan Pain level less after meds. Meets paciu discharge criteria,         Problem: Perioperative Period (Adult)  Goal: Signs and Symptoms of Listed Potential Problems Will be Absent or Manageable (Perioperative Period)  Outcome: Ongoing (interventions implemented as appropriate)

## 2017-11-21 NOTE — TELEPHONE ENCOUNTER
Called and spoke with the pt to let him know that his stent cannot come out he needs to wait until his apt which is 11/27/17. I did let the pt know he will experience discomfort and pain along with the stent which is normal. Pt verbalized understanding.

## 2017-11-24 ENCOUNTER — APPOINTMENT (OUTPATIENT)
Dept: ULTRASOUND IMAGING | Facility: HOSPITAL | Age: 32
End: 2017-11-24

## 2017-11-24 ENCOUNTER — HOSPITAL ENCOUNTER (INPATIENT)
Facility: HOSPITAL | Age: 32
LOS: 2 days | Discharge: HOME | End: 2017-11-26
Attending: EMERGENCY MEDICINE | Admitting: UROLOGY

## 2017-11-24 DIAGNOSIS — R10.9 FLANK PAIN: Primary | ICD-10-CM

## 2017-11-24 DIAGNOSIS — N20.1 RIGHT URETERAL STONE: ICD-10-CM

## 2017-11-24 LAB
ALBUMIN SERPL-MCNC: 4 G/DL (ref 3.5–5)
ALBUMIN/GLOB SERPL: 1.5 G/DL (ref 1.1–2.5)
ALP SERPL-CCNC: 60 U/L (ref 24–120)
ALT SERPL W P-5'-P-CCNC: 128 U/L (ref 0–54)
ANION GAP SERPL CALCULATED.3IONS-SCNC: 10 MMOL/L (ref 4–13)
AST SERPL-CCNC: 46 U/L (ref 7–45)
BACTERIA UR QL AUTO: ABNORMAL /HPF
BASOPHILS # BLD AUTO: 0.02 10*3/MM3 (ref 0–0.2)
BASOPHILS NFR BLD AUTO: 0.2 % (ref 0–2)
BILIRUB SERPL-MCNC: 0.3 MG/DL (ref 0.1–1)
BILIRUB UR QL STRIP: NEGATIVE
BUN BLD-MCNC: 13 MG/DL (ref 5–21)
BUN/CREAT SERPL: 12.7 (ref 7–25)
CALCIUM SPEC-SCNC: 9.2 MG/DL (ref 8.4–10.4)
CHLORIDE SERPL-SCNC: 100 MMOL/L (ref 98–110)
CLARITY UR: ABNORMAL
CO2 SERPL-SCNC: 33 MMOL/L (ref 24–31)
COLOR UR: ABNORMAL
CREAT BLD-MCNC: 1.02 MG/DL (ref 0.5–1.4)
DEPRECATED RDW RBC AUTO: 43 FL (ref 40–54)
EOSINOPHIL # BLD AUTO: 0.27 10*3/MM3 (ref 0–0.7)
EOSINOPHIL NFR BLD AUTO: 2.1 % (ref 0–4)
ERYTHROCYTE [DISTWIDTH] IN BLOOD BY AUTOMATED COUNT: 13.7 % (ref 12–15)
GFR SERPL CREATININE-BSD FRML MDRD: 84 ML/MIN/1.73
GLOBULIN UR ELPH-MCNC: 2.7 GM/DL
GLUCOSE BLD-MCNC: 106 MG/DL (ref 70–100)
GLUCOSE UR STRIP-MCNC: NEGATIVE MG/DL
HCT VFR BLD AUTO: 40.6 % (ref 40–52)
HGB BLD-MCNC: 13.6 G/DL (ref 14–18)
HGB UR QL STRIP.AUTO: ABNORMAL
HYALINE CASTS UR QL AUTO: ABNORMAL /LPF
KETONES UR QL STRIP: NEGATIVE
LEUKOCYTE ESTERASE UR QL STRIP.AUTO: ABNORMAL
LIPASE SERPL-CCNC: 39 U/L (ref 23–203)
LYMPHOCYTES # BLD AUTO: 1.7 10*3/MM3 (ref 0.72–4.86)
LYMPHOCYTES NFR BLD AUTO: 13.4 % (ref 15–45)
MCH RBC QN AUTO: 29.1 PG (ref 28–32)
MCHC RBC AUTO-ENTMCNC: 33.5 G/DL (ref 33–36)
MCV RBC AUTO: 86.9 FL (ref 82–95)
MONOCYTES # BLD AUTO: 0.94 10*3/MM3 (ref 0.19–1.3)
MONOCYTES NFR BLD AUTO: 7.4 % (ref 4–12)
NEUTROPHILS # BLD AUTO: 9.78 10*3/MM3 (ref 1.87–8.4)
NEUTROPHILS NFR BLD AUTO: 76.9 % (ref 39–78)
NITRITE UR QL STRIP: NEGATIVE
PH UR STRIP.AUTO: 7 [PH] (ref 5–8)
PLATELET # BLD AUTO: 311 10*3/MM3 (ref 130–400)
PMV BLD AUTO: 9.3 FL (ref 6–12)
POTASSIUM BLD-SCNC: 4 MMOL/L (ref 3.5–5.3)
PROT SERPL-MCNC: 6.7 G/DL (ref 6.3–8.7)
PROT UR QL STRIP: ABNORMAL
RBC # BLD AUTO: 4.67 10*6/MM3 (ref 4.8–5.9)
RBC # UR: ABNORMAL /HPF
REF LAB TEST METHOD: ABNORMAL
SODIUM BLD-SCNC: 143 MMOL/L (ref 135–145)
SP GR UR STRIP: 1.02 (ref 1–1.03)
SQUAMOUS #/AREA URNS HPF: ABNORMAL /HPF
UROBILINOGEN UR QL STRIP: ABNORMAL
WBC NRBC COR # BLD: 12.71 10*3/MM3 (ref 4.8–10.8)
WBC UR QL AUTO: ABNORMAL /HPF

## 2017-11-24 PROCEDURE — 25010000002 MORPHINE PER 10 MG: Performed by: EMERGENCY MEDICINE

## 2017-11-24 PROCEDURE — 87086 URINE CULTURE/COLONY COUNT: CPT | Performed by: EMERGENCY MEDICINE

## 2017-11-24 PROCEDURE — 25010000002 KETOROLAC TROMETHAMINE PER 15 MG: Performed by: UROLOGY

## 2017-11-24 PROCEDURE — 99283 EMERGENCY DEPT VISIT LOW MDM: CPT

## 2017-11-24 PROCEDURE — 99222 1ST HOSP IP/OBS MODERATE 55: CPT | Performed by: UROLOGY

## 2017-11-24 PROCEDURE — 83690 ASSAY OF LIPASE: CPT | Performed by: EMERGENCY MEDICINE

## 2017-11-24 PROCEDURE — 80053 COMPREHEN METABOLIC PANEL: CPT | Performed by: EMERGENCY MEDICINE

## 2017-11-24 PROCEDURE — 25010000002 HYDROMORPHONE PER 4 MG: Performed by: EMERGENCY MEDICINE

## 2017-11-24 PROCEDURE — 85025 COMPLETE CBC W/AUTO DIFF WBC: CPT | Performed by: EMERGENCY MEDICINE

## 2017-11-24 PROCEDURE — 76775 US EXAM ABDO BACK WALL LIM: CPT

## 2017-11-24 PROCEDURE — 81001 URINALYSIS AUTO W/SCOPE: CPT | Performed by: EMERGENCY MEDICINE

## 2017-11-24 PROCEDURE — 25010000002 ONDANSETRON PER 1 MG: Performed by: EMERGENCY MEDICINE

## 2017-11-24 RX ORDER — ONDANSETRON 2 MG/ML
4 INJECTION INTRAMUSCULAR; INTRAVENOUS ONCE
Status: COMPLETED | OUTPATIENT
Start: 2017-11-24 | End: 2017-11-24

## 2017-11-24 RX ORDER — SODIUM CHLORIDE 0.9 % (FLUSH) 0.9 %
10 SYRINGE (ML) INJECTION AS NEEDED
Status: DISCONTINUED | OUTPATIENT
Start: 2017-11-24 | End: 2017-11-26 | Stop reason: HOSPADM

## 2017-11-24 RX ORDER — DEXTROSE AND SODIUM CHLORIDE 5; .45 G/100ML; G/100ML
125 INJECTION, SOLUTION INTRAVENOUS CONTINUOUS
Status: DISCONTINUED | OUTPATIENT
Start: 2017-11-24 | End: 2017-11-26 | Stop reason: HOSPADM

## 2017-11-24 RX ORDER — LABETALOL HYDROCHLORIDE 5 MG/ML
20 INJECTION, SOLUTION INTRAVENOUS ONCE
Status: COMPLETED | OUTPATIENT
Start: 2017-11-24 | End: 2017-11-24

## 2017-11-24 RX ORDER — POLYETHYLENE GLYCOL 3350 17 G/17G
17 POWDER, FOR SOLUTION ORAL ONCE
Status: COMPLETED | OUTPATIENT
Start: 2017-11-25 | End: 2017-11-25

## 2017-11-24 RX ORDER — ONDANSETRON 2 MG/ML
4 INJECTION INTRAMUSCULAR; INTRAVENOUS EVERY 8 HOURS PRN
Status: COMPLETED | OUTPATIENT
Start: 2017-11-24 | End: 2017-11-25

## 2017-11-24 RX ORDER — KETOROLAC TROMETHAMINE 30 MG/ML
30 INJECTION, SOLUTION INTRAMUSCULAR; INTRAVENOUS ONCE
Status: COMPLETED | OUTPATIENT
Start: 2017-11-24 | End: 2017-11-24

## 2017-11-24 RX ORDER — SODIUM CHLORIDE 9 MG/ML
1000 INJECTION, SOLUTION INTRAVENOUS ONCE
Status: COMPLETED | OUTPATIENT
Start: 2017-11-24 | End: 2017-11-24

## 2017-11-24 RX ORDER — POLYETHYLENE GLYCOL 3350 17 G/17G
17 POWDER, FOR SOLUTION ORAL ONCE
Status: COMPLETED | OUTPATIENT
Start: 2017-11-24 | End: 2017-11-24

## 2017-11-24 RX ORDER — MORPHINE SULFATE 4 MG/ML
4 INJECTION, SOLUTION INTRAMUSCULAR; INTRAVENOUS ONCE
Status: COMPLETED | OUTPATIENT
Start: 2017-11-24 | End: 2017-11-24

## 2017-11-24 RX ADMIN — DEXTROSE AND SODIUM CHLORIDE 125 ML/HR: 5; 450 INJECTION, SOLUTION INTRAVENOUS at 20:13

## 2017-11-24 RX ADMIN — LABETALOL HYDROCHLORIDE 20 MG: 5 INJECTION, SOLUTION INTRAVENOUS at 20:14

## 2017-11-24 RX ADMIN — POLYETHYLENE GLYCOL 3350 17 G: 17 POWDER, FOR SOLUTION ORAL at 20:15

## 2017-11-24 RX ADMIN — SODIUM CHLORIDE 1000 ML: 9 INJECTION, SOLUTION INTRAVENOUS at 16:02

## 2017-11-24 RX ADMIN — MILK OF MAGNESIA 10 ML: 2400 CONCENTRATE ORAL at 20:14

## 2017-11-24 RX ADMIN — ONDANSETRON 4 MG: 2 INJECTION, SOLUTION INTRAMUSCULAR; INTRAVENOUS at 16:02

## 2017-11-24 RX ADMIN — MORPHINE SULFATE 4 MG: 4 INJECTION, SOLUTION INTRAMUSCULAR; INTRAVENOUS at 16:02

## 2017-11-24 RX ADMIN — HYDROMORPHONE HYDROCHLORIDE 1 MG: 1 INJECTION, SOLUTION INTRAMUSCULAR; INTRAVENOUS; SUBCUTANEOUS at 17:17

## 2017-11-24 RX ADMIN — KETOROLAC TROMETHAMINE 30 MG: 30 INJECTION, SOLUTION INTRAMUSCULAR at 17:17

## 2017-11-24 NOTE — ED PROVIDER NOTES
Subjective patient is a 32-year-old male who presents to the ER with abdominal pain.  Patient was recently diagnosed with a right ureteral stone.  Patient had right laser lithotripsy performed on his right ureteral stone 4 days ago and had a stent placed.  Patient removed the stent today and approx 3 hours after removing the stent, he developed abdominal pain.  Patient states the pain is located in his mid abdomen and is sharp in nature.  Pain has been constant and patient has had associated nausea and vomiting.  Patient states he has urinated since removal of the stent.  He denies any fever.  He also denies any chest pain, shortness of breath, diarrhea, neurological changes.    History provided by:  Patient   used: No        Review of Systems   Constitutional: Negative.    HENT: Negative.    Eyes: Negative.    Respiratory: Negative.    Cardiovascular: Negative.    Gastrointestinal: Positive for abdominal pain, nausea and vomiting.   Endocrine: Negative.    Genitourinary: Negative.    Musculoskeletal: Negative.    Skin: Negative.    Allergic/Immunologic: Negative.    Neurological: Negative.    Hematological: Negative.    Psychiatric/Behavioral: Negative.    All other systems reviewed and are negative.      Past Medical History:   Diagnosis Date   • ADHD    • Bipolar 1 disorder    • Depression    • Hypertension    • Kidney stone        Allergies   Allergen Reactions   • Penicillins Anaphylaxis       Past Surgical History:   Procedure Laterality Date   • FL CYSTO URETHROSCOPY W REM FB     • MANDIBLE FRACTURE SURGERY     • URETEROSCOPY LASER LITHOTRIPSY WITH STENT INSERTION Right 11/20/2017    Procedure: CYSTO URETEROSCOPY LASER LITHOTRIPSY WITH STENT INSERTION RETROGRADE PYELOGRAM STONE EXTRACTION;  Surgeon: Louis Gonzalez MD;  Location: [facility];  Service:        Family History   Problem Relation Age of Onset   • Hypertension Mother    • Hypertension Father        Social History     Social  History   • Marital status:      Spouse name: N/A   • Number of children: N/A   • Years of education: N/A     Social History Main Topics   • Smoking status: Current Some Day Smoker     Packs/day: 0.50     Years: 20.00     Types: Cigarettes   • Smokeless tobacco: Never Used   • Alcohol use No   • Drug use: No   • Sexual activity: Defer     Other Topics Concern   • None     Social History Narrative           Objective   Physical Exam   Constitutional: He is oriented to person, place, and time. He appears well-developed and well-nourished. He appears distressed.   HENT:   Head: Normocephalic and atraumatic.   Eyes: Conjunctivae are normal. Pupils are equal, round, and reactive to light.   Neck: Normal range of motion.   Cardiovascular: Regular rhythm and normal heart sounds.  Tachycardia present.    Pulmonary/Chest: Effort normal and breath sounds normal.   Abdominal: Soft. There is generalized tenderness. There is no rigidity, no rebound, no guarding and no CVA tenderness.   Musculoskeletal: Normal range of motion. He exhibits no edema or deformity.   Neurological: He is alert and oriented to person, place, and time. He has normal strength.   Skin: Skin is warm.   Psychiatric: He has a normal mood and affect. His behavior is normal.   Nursing note and vitals reviewed.      Procedures         ED Course  ED Course    He was given IV fluids, morphine and Zofran.  He did not improve.  Patient was then given Dilaudid.    Lab Results (last 24 hours)     Procedure Component Value Units Date/Time    CBC & Differential [[id number]] Collected:  11/24/17 1603    Specimen:  Blood Updated:  11/24/17 1616    Narrative:       The following orders were created for panel order CBC & Differential.  Procedure                               Abnormality         Status                     ---------                               -----------         ------                     CBC Auto Differential[[id number]]        Abnormal             Final result                 Please view results for these tests on the individual orders.    Comprehensive Metabolic Panel [[id number]]  (Abnormal) Collected:  11/24/17 1603    Specimen:  Blood Updated:  11/24/17 1620     Glucose 106 (H) mg/dL      BUN 13 mg/dL      Creatinine 1.02 mg/dL      Sodium 143 mmol/L      Potassium 4.0 mmol/L      Chloride 100 mmol/L      CO2 33.0 (H) mmol/L      Calcium 9.2 mg/dL      Total Protein 6.7 g/dL      Albumin 4.00 g/dL      ALT (SGPT) 128 (H) U/L      AST (SGOT) 46 (H) U/L      Alkaline Phosphatase 60 U/L      Total Bilirubin 0.3 mg/dL      eGFR Non African Amer 84 mL/min/1.73      Globulin 2.7 gm/dL      A/G Ratio 1.5 g/dL      BUN/Creatinine Ratio 12.7     Anion Gap 10.0 mmol/L     Lipase [[id number]]  (Normal) Collected:  11/24/17 1603    Specimen:  Blood Updated:  11/24/17 1620     Lipase 39 U/L     CBC Auto Differential [[id number]]  (Abnormal) Collected:  11/24/17 1603    Specimen:  Blood Updated:  11/24/17 1616     WBC 12.71 (H) 10*3/mm3      RBC 4.67 (L) 10*6/mm3      Hemoglobin 13.6 (L) g/dL      Hematocrit 40.6 %      MCV 86.9 fL      MCH 29.1 pg      MCHC 33.5 g/dL      RDW 13.7 %      RDW-SD 43.0 fl      MPV 9.3 fL      Platelets 311 10*3/mm3      Neutrophil % 76.9 %      Lymphocyte % 13.4 (L) %      Monocyte % 7.4 %      Eosinophil % 2.1 %      Basophil % 0.2 %      Neutrophils, Absolute 9.78 (H) 10*3/mm3      Lymphocytes, Absolute 1.70 10*3/mm3      Monocytes, Absolute 0.94 10*3/mm3      Eosinophils, Absolute 0.27 10*3/mm3      Basophils, Absolute 0.02 10*3/mm3     Urinalysis With / Culture If Indicated - Urine, Clean Catch [[id number]]  (Abnormal) Collected:  11/24/17 1606    Specimen:  Urine from Urine, Clean Catch Updated:  11/24/17 1620     Color, UA Orange (A)     Appearance, UA Turbid (A)     pH, UA 7.0     Specific Gravity, UA 1.023     Glucose, UA Negative     Ketones, UA Negative     Bilirubin, UA Negative     Blood, UA Large (3+) (A)     Protein, UA >=300  mg/dL (3+) (A)     Leuk Esterase, UA Small (1+) (A)     Nitrite, UA Negative     Urobilinogen, UA 0.2 E.U./dL    Narrative:       Dipstick results may be inaccurate due to color interference.    Urine Culture - Urine, Urine, Clean Catch [[id number]] Collected:  11/24/17 1606    Specimen:  Urine from Urine, Clean Catch Updated:  11/24/17 1620    Urinalysis, Microscopic Only - Urine, Clean Catch [[id number]]  (Abnormal) Collected:  11/24/17 1606    Specimen:  Urine from Urine, Clean Catch Updated:  11/24/17 1627     RBC, UA Too Numerous to Count (A) /HPF      WBC, UA 13-20 (A) /HPF      Bacteria, UA None Seen /HPF      Squamous Epithelial Cells, UA None Seen /HPF      Hyaline Casts, UA 0-2 /LPF      Methodology Automated Microscopy        US Renal Bilateral   Final Result   Mild dilatation of the right renal collecting system.   Otherwise, normal appearance of the kidneys. Normal color flow is   demonstrated to both testicles.   This report was finalized on 11/24/2017 16:57 by Dr. Kalin Benz MD.        Labs showed leukocytosis, hematuria and elevated LFTs.  Ultrasound showed dilatation of the right renal collecting system.  Pain was not being controlled with medications.  Patient was then admitted by Dr. Wise to the urology service for further treatment.              MDM  Number of Diagnoses or Management Options      Final diagnoses:   Flank pain            Alina Durham MD  11/24/17 2982

## 2017-11-25 ENCOUNTER — APPOINTMENT (OUTPATIENT)
Dept: ULTRASOUND IMAGING | Facility: HOSPITAL | Age: 32
End: 2017-11-25

## 2017-11-25 ENCOUNTER — APPOINTMENT (OUTPATIENT)
Dept: CT IMAGING | Facility: HOSPITAL | Age: 32
End: 2017-11-25

## 2017-11-25 LAB
ALBUMIN SERPL-MCNC: 3.6 G/DL (ref 3.5–5)
ALBUMIN/GLOB SERPL: 1.4 G/DL (ref 1.1–2.5)
ALP SERPL-CCNC: 54 U/L (ref 24–120)
ALT SERPL W P-5'-P-CCNC: 117 U/L (ref 0–54)
ANION GAP SERPL CALCULATED.3IONS-SCNC: 8 MMOL/L (ref 4–13)
AST SERPL-CCNC: 57 U/L (ref 7–45)
BASOPHILS # BLD AUTO: 0.03 10*3/MM3 (ref 0–0.2)
BASOPHILS NFR BLD AUTO: 0.3 % (ref 0–2)
BILIRUB SERPL-MCNC: 0.1 MG/DL (ref 0.1–1)
BUN BLD-MCNC: 10 MG/DL (ref 5–21)
BUN/CREAT SERPL: 11.4 (ref 7–25)
CALCIUM SPEC-SCNC: 8.9 MG/DL (ref 8.4–10.4)
CHLORIDE SERPL-SCNC: 105 MMOL/L (ref 98–110)
CO2 SERPL-SCNC: 29 MMOL/L (ref 24–31)
CREAT BLD-MCNC: 0.88 MG/DL (ref 0.5–1.4)
D-LACTATE SERPL-SCNC: 1.6 MMOL/L (ref 0.5–2)
D-LACTATE SERPL-SCNC: 2.5 MMOL/L (ref 0.5–2)
DEPRECATED RDW RBC AUTO: 46.1 FL (ref 40–54)
EOSINOPHIL # BLD AUTO: 0.37 10*3/MM3 (ref 0–0.7)
EOSINOPHIL NFR BLD AUTO: 3.8 % (ref 0–4)
ERYTHROCYTE [DISTWIDTH] IN BLOOD BY AUTOMATED COUNT: 14.1 % (ref 12–15)
GFR SERPL CREATININE-BSD FRML MDRD: 99 ML/MIN/1.73
GLOBULIN UR ELPH-MCNC: 2.5 GM/DL
GLUCOSE BLD-MCNC: 127 MG/DL (ref 70–100)
HAV IGM SERPL QL IA: NEGATIVE
HBV CORE IGM SERPL QL IA: NEGATIVE
HBV SURFACE AG SERPL QL IA: NEGATIVE
HCT VFR BLD AUTO: 39.8 % (ref 40–52)
HCV AB SER DONR QL: NEGATIVE
HCV S/C RATIO: 0.03 (ref 0–0.99)
HGB BLD-MCNC: 12.8 G/DL (ref 14–18)
HOLD SPECIMEN: NORMAL
IMM GRANULOCYTES # BLD: 0.03 10*3/MM3 (ref 0–0.03)
IMM GRANULOCYTES NFR BLD: 0.3 % (ref 0–5)
LYMPHOCYTES # BLD AUTO: 2.49 10*3/MM3 (ref 0.72–4.86)
LYMPHOCYTES NFR BLD AUTO: 25.6 % (ref 15–45)
MAGNESIUM SERPL-MCNC: 2 MG/DL (ref 1.4–2.2)
MCH RBC QN AUTO: 28.8 PG (ref 28–32)
MCHC RBC AUTO-ENTMCNC: 32.2 G/DL (ref 33–36)
MCV RBC AUTO: 89.6 FL (ref 82–95)
MONOCYTES # BLD AUTO: 0.74 10*3/MM3 (ref 0.19–1.3)
MONOCYTES NFR BLD AUTO: 7.6 % (ref 4–12)
NEUTROPHILS # BLD AUTO: 6.08 10*3/MM3 (ref 1.87–8.4)
NEUTROPHILS NFR BLD AUTO: 62.4 % (ref 39–78)
PLATELET # BLD AUTO: 275 10*3/MM3 (ref 130–400)
PMV BLD AUTO: 9.8 FL (ref 6–12)
POTASSIUM BLD-SCNC: 4.1 MMOL/L (ref 3.5–5.3)
PROCALCITONIN SERPL-MCNC: <0.25 NG/ML
PROT SERPL-MCNC: 6.1 G/DL (ref 6.3–8.7)
RBC # BLD AUTO: 4.44 10*6/MM3 (ref 4.8–5.9)
SODIUM BLD-SCNC: 142 MMOL/L (ref 135–145)
WBC NRBC COR # BLD: 9.74 10*3/MM3 (ref 4.8–10.8)

## 2017-11-25 PROCEDURE — 25010000002 HYDROMORPHONE PER 4 MG: Performed by: UROLOGY

## 2017-11-25 PROCEDURE — 85025 COMPLETE CBC W/AUTO DIFF WBC: CPT | Performed by: FAMILY MEDICINE

## 2017-11-25 PROCEDURE — 25010000002 ONDANSETRON PER 1 MG: Performed by: UROLOGY

## 2017-11-25 PROCEDURE — 84145 PROCALCITONIN (PCT): CPT | Performed by: FAMILY MEDICINE

## 2017-11-25 PROCEDURE — 25010000002 KETOROLAC TROMETHAMINE PER 15 MG: Performed by: UROLOGY

## 2017-11-25 PROCEDURE — 76705 ECHO EXAM OF ABDOMEN: CPT

## 2017-11-25 PROCEDURE — 76870 US EXAM SCROTUM: CPT

## 2017-11-25 PROCEDURE — 83605 ASSAY OF LACTIC ACID: CPT | Performed by: FAMILY MEDICINE

## 2017-11-25 PROCEDURE — 74176 CT ABD & PELVIS W/O CONTRAST: CPT

## 2017-11-25 PROCEDURE — 83605 ASSAY OF LACTIC ACID: CPT | Performed by: NURSE PRACTITIONER

## 2017-11-25 PROCEDURE — 80053 COMPREHEN METABOLIC PANEL: CPT | Performed by: FAMILY MEDICINE

## 2017-11-25 PROCEDURE — 82105 ALPHA-FETOPROTEIN SERUM: CPT | Performed by: NURSE PRACTITIONER

## 2017-11-25 PROCEDURE — 80074 ACUTE HEPATITIS PANEL: CPT | Performed by: FAMILY MEDICINE

## 2017-11-25 PROCEDURE — 99231 SBSQ HOSP IP/OBS SF/LOW 25: CPT | Performed by: UROLOGY

## 2017-11-25 PROCEDURE — 83735 ASSAY OF MAGNESIUM: CPT | Performed by: FAMILY MEDICINE

## 2017-11-25 RX ORDER — TAMSULOSIN HYDROCHLORIDE 0.4 MG/1
0.4 CAPSULE ORAL DAILY
Status: DISCONTINUED | OUTPATIENT
Start: 2017-11-25 | End: 2017-11-26 | Stop reason: HOSPADM

## 2017-11-25 RX ORDER — KETOROLAC TROMETHAMINE 15 MG/ML
15 INJECTION, SOLUTION INTRAMUSCULAR; INTRAVENOUS EVERY 6 HOURS PRN
Status: DISCONTINUED | OUTPATIENT
Start: 2017-11-25 | End: 2017-11-26 | Stop reason: HOSPADM

## 2017-11-25 RX ORDER — LISINOPRIL 10 MG/1
10 TABLET ORAL DAILY
Status: DISCONTINUED | OUTPATIENT
Start: 2017-11-25 | End: 2017-11-26 | Stop reason: HOSPADM

## 2017-11-25 RX ORDER — KETOROLAC TROMETHAMINE 30 MG/ML
30 INJECTION, SOLUTION INTRAMUSCULAR; INTRAVENOUS EVERY 6 HOURS PRN
Status: DISCONTINUED | OUTPATIENT
Start: 2017-11-25 | End: 2017-11-25

## 2017-11-25 RX ORDER — LAMOTRIGINE 100 MG/1
100 TABLET ORAL DAILY
Status: DISCONTINUED | OUTPATIENT
Start: 2017-11-25 | End: 2017-11-26 | Stop reason: HOSPADM

## 2017-11-25 RX ORDER — POLYETHYLENE GLYCOL 3350 17 G/17G
17 POWDER, FOR SOLUTION ORAL DAILY
Status: DISCONTINUED | OUTPATIENT
Start: 2017-11-25 | End: 2017-11-26 | Stop reason: HOSPADM

## 2017-11-25 RX ORDER — HYDROCODONE BITARTRATE AND ACETAMINOPHEN 5; 325 MG/1; MG/1
1 TABLET ORAL EVERY 4 HOURS PRN
Status: DISCONTINUED | OUTPATIENT
Start: 2017-11-25 | End: 2017-11-26 | Stop reason: HOSPADM

## 2017-11-25 RX ORDER — ESCITALOPRAM OXALATE 10 MG/1
10 TABLET ORAL DAILY
Status: DISCONTINUED | OUTPATIENT
Start: 2017-11-25 | End: 2017-11-26 | Stop reason: HOSPADM

## 2017-11-25 RX ADMIN — ESCITALOPRAM 10 MG: 10 TABLET, FILM COATED ORAL at 16:00

## 2017-11-25 RX ADMIN — LAMOTRIGINE 100 MG: 100 TABLET ORAL at 16:00

## 2017-11-25 RX ADMIN — KETOROLAC TROMETHAMINE 30 MG: 30 INJECTION, SOLUTION INTRAMUSCULAR at 07:57

## 2017-11-25 RX ADMIN — DEXTROSE AND SODIUM CHLORIDE 125 ML/HR: 5; 450 INJECTION, SOLUTION INTRAVENOUS at 04:19

## 2017-11-25 RX ADMIN — ONDANSETRON 4 MG: 2 INJECTION, SOLUTION INTRAMUSCULAR; INTRAVENOUS at 17:47

## 2017-11-25 RX ADMIN — DEXTROSE AND SODIUM CHLORIDE 125 ML/HR: 5; 450 INJECTION, SOLUTION INTRAVENOUS at 20:13

## 2017-11-25 RX ADMIN — HYDROMORPHONE HYDROCHLORIDE 0.5 MG: 1 INJECTION, SOLUTION INTRAMUSCULAR; INTRAVENOUS; SUBCUTANEOUS at 06:37

## 2017-11-25 RX ADMIN — TAMSULOSIN HYDROCHLORIDE 0.4 MG: 0.4 CAPSULE ORAL at 10:48

## 2017-11-25 RX ADMIN — HYDROCODONE BITARTRATE AND ACETAMINOPHEN 1 TABLET: 5; 325 TABLET ORAL at 20:45

## 2017-11-25 RX ADMIN — POLYETHYLENE GLYCOL (3350) 17 G: 17 POWDER, FOR SOLUTION ORAL at 05:18

## 2017-11-25 RX ADMIN — LISINOPRIL 10 MG: 10 TABLET ORAL at 16:00

## 2017-11-25 RX ADMIN — KETOROLAC TROMETHAMINE 15 MG: 15 INJECTION, SOLUTION INTRAMUSCULAR; INTRAVENOUS at 17:37

## 2017-11-25 RX ADMIN — HYDROMORPHONE HYDROCHLORIDE 1 MG: 1 INJECTION, SOLUTION INTRAMUSCULAR; INTRAVENOUS; SUBCUTANEOUS at 20:48

## 2017-11-25 NOTE — CONSULTS
[facility] Medicine Consult  Consults    Date of Admission: 11/24/2017  Date of Consult: 11/24/17    Primary Care Physician: Chano Harper MD  Referring Physician: Dr. Wise  Chief Complaint/Reason for Consultation: Hypertension and transaminitis    Subjective   History of Present Illness    32-year-old male with past medical history of ADHD, bipolar 1 disorder, depression, hypertension and kidney stone status post lithotripsy with stent placement comes into the ER with abdominal pain.  Apparently patient had a recent lithotripsy done with stent and patient removed his stent earlier this afternoon.  Later this afternoon he started experiencing abdominal discomfort.  He was diagnosed with right ureteral obstruction following stent removal and was admitted under urology service.  During admission patient was found to be hypertensive and his initial lab work showed transaminitis.  Hospital medicine was consulted by urologist Dr. Wise for hypertension management as well as hepatic panel abnormalities.  During admission patient was also met SIRS criteria from possible urinary origin.      Review of Systems   Otherwise complete ROS is negative except as mentioned above.    Past Medical History:   Past Medical History:   Diagnosis Date   • ADHD    • Bipolar 1 disorder    • Depression    • Hypertension    • Kidney stone      Past Surgical History:  Past Surgical History:   Procedure Laterality Date   • FL CYSTO URETHROSCOPY W REM FB     • MANDIBLE FRACTURE SURGERY     • URETEROSCOPY LASER LITHOTRIPSY WITH STENT INSERTION Right 11/20/2017    Procedure: CYSTO URETEROSCOPY LASER LITHOTRIPSY WITH STENT INSERTION RETROGRADE PYELOGRAM STONE EXTRACTION;  Surgeon: Louis Gonzalez MD;  Location: [facility];  Service:      Social History:  reports that he has been smoking Cigarettes.  He has a 10.00 pack-year smoking history. He has never used smokeless tobacco. He reports that he does  not drink alcohol or use illicit drugs.    Family History: family history includes Hypertension in his father and mother.     Allergies:   Allergies   Allergen Reactions   • Penicillins Anaphylaxis     Medications: Scheduled Meds:  magnesium hydroxide 10 mL Oral Nightly   [START ON 11/25/2017] polyethylene glycol 17 g Oral Once     Continuous Infusions:  dextrose 5 % and sodium chloride 0.45 % 125 mL/hr Last Rate: 125 mL/hr (11/24/17 2013)     PRN Meds:.HYDROmorphone  •  ondansetron  •  Insert peripheral IV **AND** sodium chloride    Objective   Objective    Physical Exam:   Temp:  [97.6 °F (36.4 °C)-98.3 °F (36.8 °C)] (P) 98.3 °F (36.8 °C)  Heart Rate:  [] (P) 110  Resp:  [15-22] (P) 22  BP: (144-178)/() (P) 169/99     Physical Exam   Constitutional: He is oriented to person, place, and time. He appears well-developed.   HENT:   Head: Normocephalic.   Eyes: Pupils are equal, round, and reactive to light.   Neck: Normal range of motion.   Cardiovascular: Normal rate, regular rhythm and normal heart sounds.    Pulmonary/Chest: Effort normal and breath sounds normal.   Abdominal: Soft. Bowel sounds are normal.   Musculoskeletal: Normal range of motion.   Neurological: He is alert and oriented to person, place, and time.   Skin: Skin is warm.   Psychiatric: He has a normal mood and affect. His behavior is normal. Judgment and thought content normal.           Results Reviewed:  I have personally reviewed current lab, radiology, and data and agree with results.  Lab Results (last 24 hours)     Procedure Component Value Units Date/Time    CBC & Differential [[id number]] Collected:  11/24/17 1603    Specimen:  Blood Updated:  11/24/17 1616    Narrative:       The following orders were created for panel order CBC & Differential.  Procedure                               Abnormality         Status                     ---------                               -----------         ------                     CBC Auto  Differential[[id number]]        Abnormal            Final result                 Please view results for these tests on the individual orders.    CBC Auto Differential [[id number]]  (Abnormal) Collected:  11/24/17 1603    Specimen:  Blood Updated:  11/24/17 1616     WBC 12.71 (H) 10*3/mm3      RBC 4.67 (L) 10*6/mm3      Hemoglobin 13.6 (L) g/dL      Hematocrit 40.6 %      MCV 86.9 fL      MCH 29.1 pg      MCHC 33.5 g/dL      RDW 13.7 %      RDW-SD 43.0 fl      MPV 9.3 fL      Platelets 311 10*3/mm3      Neutrophil % 76.9 %      Lymphocyte % 13.4 (L) %      Monocyte % 7.4 %      Eosinophil % 2.1 %      Basophil % 0.2 %      Neutrophils, Absolute 9.78 (H) 10*3/mm3      Lymphocytes, Absolute 1.70 10*3/mm3      Monocytes, Absolute 0.94 10*3/mm3      Eosinophils, Absolute 0.27 10*3/mm3      Basophils, Absolute 0.02 10*3/mm3     Urine Culture - Urine, Urine, Clean Catch [[id number]] Collected:  11/24/17 1606    Specimen:  Urine from Urine, Clean Catch Updated:  11/24/17 1620    Comprehensive Metabolic Panel [[id number]]  (Abnormal) Collected:  11/24/17 1603    Specimen:  Blood Updated:  11/24/17 1620     Glucose 106 (H) mg/dL      BUN 13 mg/dL      Creatinine 1.02 mg/dL      Sodium 143 mmol/L      Potassium 4.0 mmol/L      Chloride 100 mmol/L      CO2 33.0 (H) mmol/L      Calcium 9.2 mg/dL      Total Protein 6.7 g/dL      Albumin 4.00 g/dL      ALT (SGPT) 128 (H) U/L      AST (SGOT) 46 (H) U/L      Alkaline Phosphatase 60 U/L      Total Bilirubin 0.3 mg/dL      eGFR Non African Amer 84 mL/min/1.73      Globulin 2.7 gm/dL      A/G Ratio 1.5 g/dL      BUN/Creatinine Ratio 12.7     Anion Gap 10.0 mmol/L     Lipase [[id number]]  (Normal) Collected:  11/24/17 1603    Specimen:  Blood Updated:  11/24/17 1620     Lipase 39 U/L     Urinalysis With / Culture If Indicated - Urine, Clean Catch [[id number]]  (Abnormal) Collected:  11/24/17 1606    Specimen:  Urine from Urine, Clean Catch Updated:  11/24/17 1620     Color, UA [place] (A)      Appearance, UA Turbid (A)     pH, UA 7.0     Specific Gravity, UA 1.023     Glucose, UA Negative     Ketones, UA Negative     Bilirubin, UA Negative     Blood, UA Large (3+) (A)     Protein, UA >=300 mg/dL (3+) (A)     Leuk Esterase, UA Small (1+) (A)     Nitrite, UA Negative     Urobilinogen, UA 0.2 E.U./dL    Narrative:       Dipstick results may be inaccurate due to color interference.    Urinalysis, Microscopic Only - Urine, Clean Catch [[id number]]  (Abnormal) Collected:  11/24/17 1606    Specimen:  Urine from Urine, Clean Catch Updated:  11/24/17 1627     RBC, UA Too Numerous to Count (A) /HPF      WBC, UA 13-20 (A) /HPF      Bacteria, UA None Seen /HPF      Squamous Epithelial Cells, UA None Seen /HPF      Hyaline Casts, UA 0-2 /LPF      Methodology Automated Microscopy        Imaging Results (last 24 hours)     Procedure Component Value Units Date/Time    US Renal Bilateral [[id number]] Collected:  11/24/17 1654     Updated:  11/24/17 1700    Narrative:       EXAMINATION: US RENAL BILATERAL- 11/24/2017 4:54 PM CST     HISTORY: Abdominal pain status post ureteral stent removal     COMPARISON: CT abdomen and pelvis without contrast 11/15/2017     FINDINGS:  Transabdominal sonographic evaluation of the kidneys was performed in  the transverse and longitudinal projections. The right kidney appears  normal in size and echogenicity, measuring 13.0 cm in length. There is  mild dilatation of the right renal collecting system at the level of the  UPJ. No solid renal mass is demonstrated.     Left kidney appears normal in size and echogenicity, measuring 13.1 cm  in length. There is no sign of collecting system dilatation or solid  renal mass.     The aorta appears normal in caliber.     The urinary bladder is completely decompressed and therefore not  evaluated.     Due to patient's complaint of pain extending into the right testicle,  color flow evaluation of the testicles was performed, demonstrating  normal color  flow bilaterally.       Impression:       Mild dilatation of the right renal collecting system.  Otherwise, normal appearance of the kidneys. Normal color flow is  demonstrated to both testicles.  This report was finalized on 11/24/2017 16:57 by Dr. Kalin Benz MD.          Assessment / Plan   Assessment:     Hospital Problem List     Flank pain         1.  Right ureteral obstruction following stent removal  2.  Hypertension  3.  Transaminitis  4.  Questionable sepsis during admission  5.  Leukocytosis  6.  Abdominal discomfort possibly underlying constipation    Plan:     -Patient is currently admitted under urology service  -Ureteral obstruction management as per urology  -Pain management as per urology  -Monitor vitals  -Administer antihypertensive medication as needed overnight  -Continue patient's home hypertensive medication  -Initial hepatic panel noted  -Given patient has history of IV drug abuse and one sexual partner with hepatitis C in the past-will follow up hepatitis panel  -Follow-up abdominal ultrasound  -Hold or avoid hepatotoxic medications now  -Initially during admission patient met SIRS criteria  -Received IV fluid bolus in the ER  -Currently vitals does not indicate any signs of sepsis  -Will continue to monitor for sepsis  -Agree with Dr. Wise as would start broad-spectrum antibiotics if patient shows signs of acute infection or worsening symptoms  -Follow-up blood culture  -Follow-up urine culture  -Follow-up lactic acid  -Follow-up pro-calcitonin  -Follow-up a.m. WBC  -Consider aggressive fluid resuscitation if indicated  -Continue bowel regimen for constipation  -GI prophylaxis  -DVT prophylaxis    I discussed the patients findings and my recommendations with Patient and his RN .    Wallace Garcia MD  11/24/17  10:27 PM

## 2017-11-25 NOTE — PLAN OF CARE
Problem: Patient Care Overview (Adult)  Goal: Plan of Care Review  Outcome: Ongoing (interventions implemented as appropriate)    11/25/17 1424   Coping/Psychosocial Response Interventions   Plan Of Care Reviewed With patient   Patient Care Overview   Progress improving   Outcome Evaluation   Outcome Summary/Follow up Plan Patient initially exhibited pain this AM. Patient gave toradol after c/o dilaudid not working. Patient reports complete decrease in pain w/ toradol. US of scrotum and abd today, as well as abd CT. patient tolerating regular diet. Will be NPO after midnight in case the need arises for Dr. Wise to take to OR. Patient did pass a very tiny kidney stone this afternoon (after the CT). Dr. Wise notified. Stone placed in container on back of sink in the event Dr Wise wants to evaluate. Will cont to monitor.        Goal: Adult Individualization and Mutuality  Outcome: Ongoing (interventions implemented as appropriate)  Goal: Discharge Needs Assessment  Outcome: Ongoing (interventions implemented as appropriate)    Problem: Pain, Acute (Adult)  Goal: Acceptable Pain Control/Comfort Level  Outcome: Ongoing (interventions implemented as appropriate)    11/25/17 1424   Pain, Acute (Adult)   Acceptable Pain Control/Comfort Level making progress toward outcome

## 2017-11-25 NOTE — PROGRESS NOTES
[facility] Medicine Services  INPATIENT PROGRESS NOTE    Length of Stay: 1  Date of Admission: 11/24/2017  Primary Care Physician: Chano Harper MD    Subjective   Chief Complaint: follow up transaminitis and hypertension    HPI   Patient resting quietly, no complaints, denies scrotal pain this afternoon.  States may have a procedure tomorrow. Per documentation, patient refusing intervention. No distress.  Long discussion re: need to follow up liver lesion.  Currently followed intermittently by Dr Harper.      Review of Systems   All pertinent negatives and positives are as above. All other systems have been reviewed and are negative unless otherwise stated.     Objective    Temp:  [97.6 °F (36.4 °C)-98.3 °F (36.8 °C)] 98.2 °F (36.8 °C)  Heart Rate:  [] 79  Resp:  [15-22] 20  BP: (130-178)/() 137/91    Physical Exam   Constitutional: He is oriented to person, place, and time. He appears well-developed and well-nourished. No distress.   HENT:   Head: Normocephalic and atraumatic.   Eyes: Conjunctivae and EOM are normal. Pupils are equal, round, and reactive to light. No scleral icterus.   Neck: Normal range of motion. Neck supple. No JVD present. No tracheal deviation present.   Cardiovascular: Normal rate, regular rhythm, normal heart sounds and intact distal pulses.  Exam reveals no gallop.    No murmur heard.  Pulmonary/Chest: Effort normal and breath sounds normal. No respiratory distress. He has no wheezes. He has no rales.   Abdominal: Soft. Bowel sounds are normal. He exhibits no distension. There is no tenderness. There is no guarding.   Musculoskeletal: Normal range of motion. He exhibits no edema.   Neurological: He is alert and oriented to person, place, and time.   No obvious deficits noted.   Skin: Skin is warm and dry. No rash noted. He is not diaphoretic. No erythema. No pallor.   Psychiatric: He has a normal mood and affect. His behavior is normal.    Vitals reviewed.      Results Review:  Recent Results (from the past 12 hour(s))   Comprehensive Metabolic Panel    Collection Time: 11/25/17  5:24 AM   Result Value Ref Range    Glucose 127 (H) 70 - 100 mg/dL    BUN 10 5 - 21 mg/dL    Creatinine 0.88 0.50 - 1.40 mg/dL    Sodium 142 135 - 145 mmol/L    Potassium 4.1 3.5 - 5.3 mmol/L    Chloride 105 98 - 110 mmol/L    CO2 29.0 24.0 - 31.0 mmol/L    Calcium 8.9 8.4 - 10.4 mg/dL    Total Protein 6.1 (L) 6.3 - 8.7 g/dL    Albumin 3.60 3.50 - 5.00 g/dL    ALT (SGPT) 117 (H) 0 - 54 U/L    AST (SGOT) 57 (H) 7 - 45 U/L    Alkaline Phosphatase 54 24 - 120 U/L    Total Bilirubin 0.1 0.1 - 1.0 mg/dL    eGFR Non African Amer 99 >60 mL/min/1.73    Globulin 2.5 gm/dL    A/G Ratio 1.4 1.1 - 2.5 g/dL    BUN/Creatinine Ratio 11.4 7.0 - 25.0    Anion Gap 8.0 4.0 - 13.0 mmol/L   Magnesium    Collection Time: 11/25/17  5:24 AM   Result Value Ref Range    Magnesium 2.0 1.4 - 2.2 mg/dL   Lactic Acid, Plasma    Collection Time: 11/25/17  5:24 AM   Result Value Ref Range    Lactate 2.5 (C) 0.5 - 2.0 mmol/L   Procalcitonin    Collection Time: 11/25/17  5:24 AM   Result Value Ref Range    Procalcitonin <0.25 <=0.25 ng/mL   Hepatitis Panel, Acute    Collection Time: 11/25/17  5:24 AM   Result Value Ref Range    HCV S/C Ratio 0.03 0.00 - 0.99    Hepatitis C Ab Negative Negative    Hep A IgM Negative Negative    Hep B C IgM Negative Negative    Hepatitis B Surface Ag Negative Negative   CBC Auto Differential    Collection Time: 11/25/17  5:24 AM   Result Value Ref Range    WBC 9.74 4.80 - 10.80 10*3/mm3    RBC 4.44 (L) 4.80 - 5.90 10*6/mm3    Hemoglobin 12.8 (L) 14.0 - 18.0 g/dL    Hematocrit 39.8 (L) 40.0 - 52.0 %    MCV 89.6 82.0 - 95.0 fL    MCH 28.8 28.0 - 32.0 pg    MCHC 32.2 (L) 33.0 - 36.0 g/dL    RDW 14.1 12.0 - 15.0 %    RDW-SD 46.1 40.0 - 54.0 fl    MPV 9.8 6.0 - 12.0 fL    Platelets 275 130 - 400 10*3/mm3    Neutrophil % 62.4 39.0 - 78.0 %    Lymphocyte % 25.6 15.0 - 45.0 %     Monocyte % 7.6 4.0 - 12.0 %    Eosinophil % 3.8 0.0 - 4.0 %    Basophil % 0.3 0.0 - 2.0 %    Immature Grans % 0.3 0.0 - 5.0 %    Neutrophils, Absolute 6.08 1.87 - 8.40 10*3/mm3    Lymphocytes, Absolute 2.49 0.72 - 4.86 10*3/mm3    Monocytes, Absolute 0.74 0.19 - 1.30 10*3/mm3    Eosinophils, Absolute 0.37 0.00 - 0.70 10*3/mm3    Basophils, Absolute 0.03 0.00 - 0.20 10*3/mm3    Immature Grans, Absolute 0.03 0.00 - 0.03 10*3/mm3   Lactic Acid, Reflex Timer    Collection Time: 11/25/17  5:24 AM   Result Value Ref Range    Extra Tube Hold for add-ons.    Lactic Acid, Plasma    Collection Time: 11/25/17  8:25 AM   Result Value Ref Range    Lactate 1.6 0.5 - 2.0 mmol/L       Cultures:  Urine Culture   Date Value Ref Range Status   11/24/2017 No growth at 24 hours  Preliminary       Radiology Data:    Imaging Results (last 24 hours)     Procedure Component Value Units Date/Time    US Renal Bilateral [[id number]] Collected:  11/24/17 1654     Updated:  11/24/17 1700    Narrative:       EXAMINATION: US RENAL BILATERAL- 11/24/2017 4:54 PM CST     HISTORY: Abdominal pain status post ureteral stent removal     COMPARISON: CT abdomen and pelvis without contrast 11/15/2017     FINDINGS:  Transabdominal sonographic evaluation of the kidneys was performed in  the transverse and longitudinal projections. The right kidney appears  normal in size and echogenicity, measuring 13.0 cm in length. There is  mild dilatation of the right renal collecting system at the level of the  UPJ. No solid renal mass is demonstrated.     Left kidney appears normal in size and echogenicity, measuring 13.1 cm  in length. There is no sign of collecting system dilatation or solid  renal mass.     The aorta appears normal in caliber.     The urinary bladder is completely decompressed and therefore not  evaluated.     Due to patient's complaint of pain extending into the right testicle,  color flow evaluation of the testicles was performed,  demonstrating  normal color flow bilaterally.       Impression:       Mild dilatation of the right renal collecting system.  Otherwise, normal appearance of the kidneys. Normal color flow is  demonstrated to both testicles.  This report was finalized on 11/24/2017 16:57 by Dr. Kalin Benz MD.    CT Abdomen Pelvis Stone Protocol [[id number]] Collected:  11/25/17 0913     Updated:  11/25/17 0924    Narrative:       EXAM: CT Abdomen and Pelvis without contrast      11/25/2017 9:13 AM CST  INDICATION: right hydronephrosis s/p ureteroscopy; R10.9-Unspecified  abdominal pain  COMPARISON: CT abdomen pelvis dated 11/15/2017.  TECHNIQUE:  Abdomen and pelvis were scanned utilizing a multidetector helical  scanner from the diaphragm to the ischial tuberosities without  administration of IV contrast. Coronal and sagittal reformations were  obtained. [Routine protocol is performed.]     Radiation dose equals DLP 4/17. mGy-cm.  Automated exposure control dose  reduction technique was implemented.        FINDINGS:     LINES and TUBES: None.     LOWER THORAX: 3 mm noncalcified nodule in the right middle lobe. No  focal consolidation.     HEPATOBILIARY:  No focal hepatic lesions.   No liver laceration.   No  biliary ductal dilatation.      GALLBLADDER:  No radiopaque stones or sludge.   No wall thickening.      SPLEEN:  No splenomegaly.    No splenic laceration.      PANCREAS:  No focal masses or ductal dilatation.      ADRENALS:  No adrenal nodules.      KIDNEYS/URETERS: Moderate hydronephrosis and hydroureter redemonstrated,  without significant interval changes. However, the previously seen 5 m  stone at the distal right ureter is not visualized on this examination.  There is now a 2 mm stone in the mid to distal right ureter (image 113,  series 2). Nonobstructive punctate stone in the inferior pole of the  right kidney is redemonstrated. Perinephric and periureteral fat  stranding. The left kidney is without stones or evidence  of  hydronephrosis. No discrete mass identified.      GI TRACT:  No abnormal distention, wall thickening, or evidence of  bowel obstruction.   No evidence of acute appendicitis..      PELVIC ORGANS/BLADDER:  No acute pathology. Calcific density in the  prostate redemonstrated, which may reflect dystrophic calcification  versus a prostatic ureteral stone..      LYMPH NODES:  No lymphadenopathy.      VESSELS:  No acute pathology..      PERITONEUM / RETROPERITONEUM:  No free air or fluid.      BONES:  No suspicious lytic or blastic lesion..      SOFT TISSUES:  Unremarkable.        Impression:          1. Moderate right hydronephrosis and hydroureter. Associated  infiltrative changes may reflect infection or posttreatment changes.  2. 2 mm in the mid to distal right ureter.  3. Previously seen distal right ureter obstructive stone appears to have  progressed.  This report was finalized on 11/25/2017 09:21 by Dr. Flori Cordoba MD.    US Scrotum & Testicles [[id number]] Collected:  11/25/17 1025     Updated:  11/25/17 1033    Narrative:       EXAM: TESTICULAR/SCROTUM ULTRASOUND      DATE:  11/25/2017      COMPARISON: None.      INDICATION:    32 years-year-old Male with right testicular pain.     PROCEDURE: Two-dimensional grayscale , spectral Doppler, and color  Doppler ultrasound examination of the testes was performed.      FINDINGS:   Right testicle: The right testicle has normal contour and echogenicity  and is without mass.There is flow. The right testicle measures 5.2 x 2.5  x 3.3 cm. There is a small epididymal head cyst measuring 0.4 cm.     Left testicle: The left testicle has normal contour and echogenicity and  is without mass.There is flow. The left testicle measures 4.9 x 2.3 x  3.3 cm. Small left epididymal head cyst measuring 0.3 cm     Testicular flow is symmetric bilaterally.       Impression:       Unremarkable testicular ultrasound.  This report was finalized on 11/25/2017 10:30 by Dr. Flori Cordoba  MD.    US Abdomen Limited [[id number]] Collected:  11/25/17 1030     Updated:  11/25/17 1036    Narrative:       EXAMINATION: US ABDOMEN LIMITED-.     HISTORY: Transaminitis; R10.9-Unspecified abdominal pain     COMPARISON: CT abdomen pelvis dated 11/24/2017.     PROCEDURE: Real-time sonographic evaluation of the right upper quadrant  was performed. Multiple representative images were obtained and archived  to PACS for review.     FINDINGS:     PANCREAS: The visualized pancreas is normal. The distal body and tail  are obscured due to overlying bowel gas.      LIVER: Diffusely hyperechoic appearance of the liver, consistent with  hepatosteatosis. Additionally, the left lobe of the liver shows a 2 x  1.5 cm hypoechoic focus, which may reflect occult fatty sparing versus a  discrete lesion..      GALLBLADDER AND BILE DUCTS: The gallbladder is not distended. There are  no gallstones. The gallbladder wall measures 2 mm in thickness. Multiple  other polyps are present. The one measures 4 mm. There is no  pericholecystic fluid. There is no intra-or extrahepatic biliary ductal  dilatation. The common bile duct is non-dilated and measures 3  millimeters .     RIGHT KIDNEY: Mild to moderate hydronephrosis on the right..       Impression:       1. Diffuse hepatosteatosis.  2. 2 cm hypoechoic lesion in the left lobe of the liver. Further  characterization with MRI of the abdomen with and without contrast on an  outpatient basis recommended.  3. Gallbladder polyps.  4. Mild to moderate right hydronephrosis.           This report was finalized on 11/25/2017 10:33 by Dr. Flori Cordoba MD.            Intake/Output Summary (Last 24 hours) at 11/25/17 1421  Last data filed at 11/25/17 0717   Gross per 24 hour   Intake              908 ml   Output             2175 ml   Net            -1267 ml       Allergies   Allergen Reactions   • Penicillins Anaphylaxis       Scheduled meds:     magnesium hydroxide 10 mL Oral Nightly   tamsulosin  0.4 mg Oral Daily       PRN meds:  HYDROmorphone  •  ketorolac  •  ondansetron  •  Insert peripheral IV **AND** sodium chloride    Assessment/Plan     Active Problems:    Flank pain  1.  Right ureteral obstruction following stent removal  2.  Transaminitis - diffuse hepatosteatosis, negative hepatitis screen  3.  Questionable sepsis during admission  4.  Mild to moderate right hydronephrosis.  5.  Leukocytosis - resolved  6.  Hypertension  7.  Abdominal discomfort possibly underlying constipation  8.  2 cm hypoechoic lesion in the left lobe of the liver. MRI of the abdomen with and without contrast on an  outpatient basis recommended.      Plan:  1. Home medications reviewed and restarted   2. Labs in am: CBC w/diff and BMP  3. Check AFP    Discharge Planning:  Per Dr Frandy Pineda, APRN   11/25/17   2:21 PM

## 2017-11-25 NOTE — NURSING NOTE
Call from Lab received concerning critical lactic acid of 2.5 - RN caring for pt notified at this time. MAXI Mendosa

## 2017-11-25 NOTE — H&P
Urology H&P    Mr. Berg is 32 y.o. male    CHIEF COMPLAINT: I have pain after taking out my stent.    HPI  This is a 32-year-old white male who developed the very sudden and moderately severe pain in the right upper abdomen about 8 hours ago.  This occurred in the context of having removed his own ureteral stent which followed right ureteroscopic laser lithotripsy with stone basket extraction and stent placement by Dr. Louis Gonzalez 4 days ago.  It is not associated with nausea vomiting or significant hematuria.  He has received relief with parenteral analgesics.  He does complain also of constipation.  The latter is affected by him having to take oral opiate therapy for his postoperative convalescence.    The following portions of the patient's history were reviewed and updated as appropriate: allergies, current medications, past family history, past medical history, past social history, past surgical history and problem list.    Review of Systems   Constitutional: Negative for appetite change and fever.   HENT: Negative for hearing loss and sore throat.    Eyes: Negative for pain and redness.   Respiratory: Negative for cough and shortness of breath.    Cardiovascular: Negative for chest pain and leg swelling.   Gastrointestinal: Positive for abdominal pain. Negative for anal bleeding, nausea and vomiting.   Endocrine: Negative for cold intolerance and heat intolerance.   Genitourinary: Negative for dysuria, flank pain and hematuria.   Musculoskeletal: Negative for joint swelling and myalgias.   Skin: Negative for color change and rash.   Allergic/Immunologic: Negative for immunocompromised state.   Neurological: Negative for dizziness and speech difficulty.   Hematological: Negative for adenopathy. Does not bruise/bleed easily.   Psychiatric/Behavioral: Negative for dysphoric mood and suicidal ideas.       Prescriptions Prior to Admission   Medication Sig Dispense Refill Last Dose   • Escitalopram Oxalate  (LEXAPRO PO) Take 10 mg by mouth Every Morning.   2017 at 0800   • HYDROcodone-acetaminophen (NORCO) 5-325 MG per tablet Take 1 tablet by mouth Every 4 (Four) Hours As Needed for Moderate Pain  or Severe Pain . 18 tablet 0 2017 at 1300   • lamoTRIgine (LaMICtal) 100 MG tablet Take 100 mg by mouth Daily.   2017 at Unknown time   • lisinopril (PRINIVIL,ZESTRIL) 10 MG tablet Take 10 mg by mouth Every Morning.   2017 at 0800   • Methylphenidate HCl ER (CONCERTA) 18 MG CR tablet Take 36 mg by mouth Every Morning.   2017   • naproxen (NAPROSYN) 500 MG tablet Take 1 tablet by mouth 2 (Two) Times a Day With Meals. 20 tablet 0 2017 at Unknown time   • ondansetron ODT (ZOFRAN-ODT) 4 MG disintegrating tablet Take 1 tablet by mouth Every 8 (Eight) Hours As Needed for Nausea or Vomiting. 10 tablet 0 2017 at Unknown time   • phenazopyridine (PYRIDIUM) 100 MG tablet Take 1 tablet by mouth 3 (Three) Times a Day As Needed (dysuria). 12 tablet 0 2017 at Unknown time   • tamsulosin (FLOMAX) 0.4 MG capsule 24 hr capsule Take 1 capsule by mouth Every Night. 14 capsule 0 2017 at Unknown time   • [] ciprofloxacin (CIPRO) 500 MG tablet Take 1 tablet by mouth 2 (Two) Times a Day for 3 days. 6 tablet 0          Current Facility-Administered Medications:   •  HYDROmorphone (DILAUDID) injection 0.5 mg, 0.5 mg, Intravenous, Q2H PRN, Zach Wise MD  •  labetalol (NORMODYNE,TRANDATE) injection 20 mg, 20 mg, Intravenous, Once, Wallace Garcia MD  •  ondansetron (ZOFRAN) injection 4 mg, 4 mg, Intravenous, Q8H PRN, Zach Wise MD  •  Insert peripheral IV, , , Once **AND** sodium chloride 0.9 % flush 10 mL, 10 mL, Intravenous, PRN, Alina Durham MD    Past Medical History:   Diagnosis Date   • ADHD    • Bipolar 1 disorder    • Depression    • Hypertension    • Kidney stone        Past Surgical History:   Procedure Laterality Date   • FL CYSTO URETHROSCOPY W REM FB     •  "MANDIBLE FRACTURE SURGERY     • URETEROSCOPY LASER LITHOTRIPSY WITH STENT INSERTION Right 11/20/2017    Procedure: CYSTO URETEROSCOPY LASER LITHOTRIPSY WITH STENT INSERTION RETROGRADE PYELOGRAM STONE EXTRACTION;  Surgeon: Louis Gonzalez MD;  Location: [facility] OR;  Service:        Social History     Social History   • Marital status:      Spouse name: N/A   • Number of children: N/A   • Years of education: N/A     Social History Main Topics   • Smoking status: Current Some Day Smoker     Packs/day: 0.50     Years: 20.00     Types: Cigarettes   • Smokeless tobacco: Never Used   • Alcohol use No   • Drug use: No   • Sexual activity: Defer     Other Topics Concern   • None     Social History Narrative       Family History   Problem Relation Age of Onset   • Hypertension Mother    • Hypertension Father        BP (!) 154/108 (BP Location: Left arm, Patient Position: Sitting)  Pulse 98  Temp 98.2 °F (36.8 °C) (Oral)   Resp 16  Ht 69\" (175.3 cm)  Wt 187 lb (84.8 kg)  SpO2 100%  BMI 27.62 kg/m2    Physical Exam  Constitutional: Well nourished, Well developed; No apparent distress; Vital reviewed as above  Psychiatric: Appropriate affect; Alert and oriented  Eyes: Unremarkable  Musculoskeletal: Normal gait and station  GI: Abdomen is soft, without distention.  He has some mild tenderness in the right upper quadrant and right epigastrium.  There is no CVA tenderness over the right kidney.  Respiratory: No distress; Unlabored movement; No accessory musculature needed with symmetric movements  Skin: No pallor or diaphoresis  Lymphatic: No adenopathy neck or groin    Lab Results   Component Value Date    GLUCOSE 106 (H) 11/24/2017    BUN 13 11/24/2017    CREATININE 1.02 11/24/2017    EGFRIFNONA 84 11/24/2017    BCR 12.7 11/24/2017    CO2 33.0 (H) 11/24/2017    CALCIUM 9.2 11/24/2017    ALBUMIN 4.00 11/24/2017    LABIL2 1.5 11/24/2017    AST 46 (H) 11/24/2017     (H) 11/24/2017     Lab Results   Component " Value Date    GLUCOSE 106 (H) 11/24/2017    CALCIUM 9.2 11/24/2017     11/24/2017    K 4.0 11/24/2017    CO2 33.0 (H) 11/24/2017     11/24/2017    BUN 13 11/24/2017    CREATININE 1.02 11/24/2017    EGFRIFNONA 84 11/24/2017    BCR 12.7 11/24/2017    ANIONGAP 10.0 11/24/2017     Lab Results   Component Value Date    WBC 12.71 (H) 11/24/2017    HGB 13.6 (L) 11/24/2017    HCT 40.6 11/24/2017    MCV 86.9 11/24/2017     11/24/2017     No results found for: PSA  No results found for: URINECX  Brief Urine Lab Results  (Last result in the past 365 days)      Color   Clarity   Blood   Leuk Est   Nitrite   Protein   CREAT   Urine HCG        11/24/17 1606 Orange(A) Turbid(A) Large (3+)(A) Small (1+)(A) Negative >=300 mg/dL (3+)(A)               Independent renal ultrasound review  The renal ultrasound is available for me to review.  Treatment recommendations require an independent review.  This film has been reviewed by the radiologist to determine any non urologic abnormalities that are presents.  However, I very closely inspected the kidneys for size, symmetry, contour, parenchymal thickness, perinephric reaction, presence of calcifications, and intrarenal dilation of the collecting system.  This US shows No evidence of stone but there is mild hydronephrosis noted on the right side consistent with ureteral obstruction.      Assessment and Plan  #1.  Right ureteral obstruction following stent removal  #2.  Hypertension  #3. Elevated transaminases    Patient is admitted with abdominal pain and mild ureteral obstruction following removing his stent earlier today.  He did follow appropriate instructions by Dr. Gonzalez.  His pain has been controlled with analgesic therapy.  I do think that some of his epigastric discomfort is due to some constipation.    We will give IV parenteral analgesics as indicated.  We will also add a nonsteroidal anti-inflammatory drug.  If his epigastric pain worsened we would stop this.   I will put him on a when necessary antiacid as well as Prevacid.  I will also treat his constipation with MiraLAX and milk of magnesia.    He is tachycardic, has a mild leukocytosis and abnormal urinalysis which meets sepsis criteria but this patient has absolutely no other clinical signs based on my examination and review of findings.  We will await cultures.  Would start broad-spectrum antibiotics should he develop fever or worsening symptoms.    I would ask that the hospitalist service see the patient with regards to his hypertension.  I think his pain is well controlled now and he says he has difficulty controlling this at home even though he takes an antihypertensive. Would also appreciate Dr. Garcia's recommendations on his elevated transaminases.       Zach Wise MD  11/24/17  7:17 PM

## 2017-11-25 NOTE — PLAN OF CARE
Problem: Patient Care Overview (Adult)  Goal: Plan of Care Review  Outcome: Ongoing (interventions implemented as appropriate)    11/25/17 0308   Coping/Psychosocial Response Interventions   Plan Of Care Reviewed With patient   Patient Care Overview   Progress no change   Outcome Evaluation   Outcome Summary/Follow up Plan Pt. has denied pain so far this shift; IVF initiated; one time dose of Labetalol given; B/P better; Pt. has been NPO since MN; Hospitalist ordered Hepatitis work up and Liver US to be done in AM d/t elevated liver enzymes; will continue to monitor.       Goal: Adult Individualization and Mutuality  Outcome: Ongoing (interventions implemented as appropriate)  Goal: Discharge Needs Assessment  Outcome: Ongoing (interventions implemented as appropriate)    Problem: Pain, Acute (Adult)  Goal: Identify Related Risk Factors and Signs and Symptoms  Outcome: Outcome(s) achieved Date Met:  11/25/17  Goal: Acceptable Pain Control/Comfort Level  Outcome: Ongoing (interventions implemented as appropriate)

## 2017-11-25 NOTE — PLAN OF CARE
Problem: Patient Care Overview (Adult)  Goal: Plan of Care Review  Outcome: Ongoing (interventions implemented as appropriate)    11/24/17 1851   Coping/Psychosocial Response Interventions   Plan Of Care Reviewed With patient   Patient Care Overview   Progress no change   Outcome Evaluation   Outcome Summary/Follow up Plan Patient admitted from ER. Reported pain down in ER however, reports no pain at this time. Patient is now sepsis positive related to sepsis screen. Paged Dr. Wise and awaiting a callback. Patients blood pressure is also elevated. Will discuss w/ Dr. Wise during phone call. Will cont to monitor.        Goal: Adult Individualization and Mutuality  Outcome: Ongoing (interventions implemented as appropriate)  Goal: Discharge Needs Assessment  Outcome: Ongoing (interventions implemented as appropriate)    Problem: Pain, Acute (Adult)  Goal: Identify Related Risk Factors and Signs and Symptoms  Outcome: Ongoing (interventions implemented as appropriate)    11/24/17 1851   Pain, Acute   Related Risk Factors (Acute Pain) fear;patient perception   Signs and Symptoms (Acute Pain) verbalization of pain descriptors       Goal: Acceptable Pain Control/Comfort Level  Outcome: Ongoing (interventions implemented as appropriate)

## 2017-11-26 VITALS
DIASTOLIC BLOOD PRESSURE: 102 MMHG | BODY MASS INDEX: 27.7 KG/M2 | RESPIRATION RATE: 18 BRPM | WEIGHT: 187 LBS | SYSTOLIC BLOOD PRESSURE: 136 MMHG | OXYGEN SATURATION: 99 % | HEART RATE: 91 BPM | TEMPERATURE: 97.7 F | HEIGHT: 69 IN

## 2017-11-26 LAB
ANION GAP SERPL CALCULATED.3IONS-SCNC: 8 MMOL/L (ref 4–13)
BACTERIA SPEC AEROBE CULT: NORMAL
BASOPHILS # BLD AUTO: 0.04 10*3/MM3 (ref 0–0.2)
BASOPHILS NFR BLD AUTO: 0.4 % (ref 0–2)
BUN BLD-MCNC: 10 MG/DL (ref 5–21)
BUN/CREAT SERPL: 10.5 (ref 7–25)
CALCIUM SPEC-SCNC: 9 MG/DL (ref 8.4–10.4)
CHLORIDE SERPL-SCNC: 103 MMOL/L (ref 98–110)
CO2 SERPL-SCNC: 31 MMOL/L (ref 24–31)
CREAT BLD-MCNC: 0.95 MG/DL (ref 0.5–1.4)
DEPRECATED RDW RBC AUTO: 46 FL (ref 40–54)
EOSINOPHIL # BLD AUTO: 0.46 10*3/MM3 (ref 0–0.7)
EOSINOPHIL NFR BLD AUTO: 4 % (ref 0–4)
ERYTHROCYTE [DISTWIDTH] IN BLOOD BY AUTOMATED COUNT: 14 % (ref 12–15)
GFR SERPL CREATININE-BSD FRML MDRD: 91 ML/MIN/1.73
GLUCOSE BLD-MCNC: 114 MG/DL (ref 70–100)
HCT VFR BLD AUTO: 40.9 % (ref 40–52)
HGB BLD-MCNC: 13.1 G/DL (ref 14–18)
IMM GRANULOCYTES # BLD: 0.02 10*3/MM3 (ref 0–0.03)
IMM GRANULOCYTES NFR BLD: 0.2 % (ref 0–5)
LYMPHOCYTES # BLD AUTO: 2.66 10*3/MM3 (ref 0.72–4.86)
LYMPHOCYTES NFR BLD AUTO: 23.3 % (ref 15–45)
MCH RBC QN AUTO: 28.8 PG (ref 28–32)
MCHC RBC AUTO-ENTMCNC: 32 G/DL (ref 33–36)
MCV RBC AUTO: 89.9 FL (ref 82–95)
MONOCYTES # BLD AUTO: 1.08 10*3/MM3 (ref 0.19–1.3)
MONOCYTES NFR BLD AUTO: 9.5 % (ref 4–12)
NEUTROPHILS # BLD AUTO: 7.14 10*3/MM3 (ref 1.87–8.4)
NEUTROPHILS NFR BLD AUTO: 62.6 % (ref 39–78)
PLATELET # BLD AUTO: 283 10*3/MM3 (ref 130–400)
PMV BLD AUTO: 9.7 FL (ref 6–12)
POTASSIUM BLD-SCNC: 4.1 MMOL/L (ref 3.5–5.3)
RBC # BLD AUTO: 4.55 10*6/MM3 (ref 4.8–5.9)
SODIUM BLD-SCNC: 142 MMOL/L (ref 135–145)
WBC NRBC COR # BLD: 11.4 10*3/MM3 (ref 4.8–10.8)

## 2017-11-26 PROCEDURE — 85025 COMPLETE CBC W/AUTO DIFF WBC: CPT | Performed by: NURSE PRACTITIONER

## 2017-11-26 PROCEDURE — 99238 HOSP IP/OBS DSCHRG MGMT 30/<: CPT | Performed by: UROLOGY

## 2017-11-26 PROCEDURE — 25010000002 KETOROLAC TROMETHAMINE PER 15 MG: Performed by: UROLOGY

## 2017-11-26 PROCEDURE — 80048 BASIC METABOLIC PNL TOTAL CA: CPT | Performed by: NURSE PRACTITIONER

## 2017-11-26 RX ORDER — HYDROCODONE BITARTRATE AND ACETAMINOPHEN 5; 325 MG/1; MG/1
1 TABLET ORAL EVERY 6 HOURS PRN
Qty: 20 TABLET | Refills: 0 | Status: SHIPPED | OUTPATIENT
Start: 2017-11-26 | End: 2017-12-01

## 2017-11-26 RX ADMIN — TAMSULOSIN HYDROCHLORIDE 0.4 MG: 0.4 CAPSULE ORAL at 08:32

## 2017-11-26 RX ADMIN — LISINOPRIL 10 MG: 10 TABLET ORAL at 08:32

## 2017-11-26 RX ADMIN — LAMOTRIGINE 100 MG: 100 TABLET ORAL at 08:32

## 2017-11-26 RX ADMIN — ESCITALOPRAM 10 MG: 10 TABLET, FILM COATED ORAL at 08:32

## 2017-11-26 RX ADMIN — DEXTROSE AND SODIUM CHLORIDE 125 ML/HR: 5; 450 INJECTION, SOLUTION INTRAVENOUS at 03:38

## 2017-11-26 RX ADMIN — KETOROLAC TROMETHAMINE 15 MG: 15 INJECTION, SOLUTION INTRAMUSCULAR; INTRAVENOUS at 03:38

## 2017-11-26 NOTE — PLAN OF CARE
Problem: Patient Care Overview (Adult)  Goal: Plan of Care Review  Outcome: Ongoing (interventions implemented as appropriate)    11/26/17 0245   Coping/Psychosocial Response Interventions   Plan Of Care Reviewed With patient   Patient Care Overview   Progress no change   Outcome Evaluation   Outcome Summary/Follow up Plan pain meds given with relief. cont to monitor. npo.          Problem: Pain, Acute (Adult)  Goal: Acceptable Pain Control/Comfort Level  Outcome: Ongoing (interventions implemented as appropriate)    11/25/17 1424   Pain, Acute (Adult)   Acceptable Pain Control/Comfort Level making progress toward outcome

## 2017-11-26 NOTE — PLAN OF CARE
Problem: Patient Care Overview (Adult)  Goal: Plan of Care Review  Outcome: Outcome(s) achieved Date Met:  11/26/17 11/26/17 1203   Coping/Psychosocial Response Interventions   Plan Of Care Reviewed With patient   Patient Care Overview   Progress improving   Outcome Evaluation   Outcome Summary/Follow up Plan Patient d/c home to the care of self. Verbalizes pain however notes that PO pain meds decrease the level of pain. Patient tolerating diet. Ambulating well. Verbalizes ready for d/c.        Goal: Adult Individualization and Mutuality  Outcome: Outcome(s) achieved Date Met:  11/26/17  Goal: Discharge Needs Assessment  Outcome: Outcome(s) achieved Date Met:  11/26/17    Problem: Pain, Acute (Adult)  Goal: Acceptable Pain Control/Comfort Level  Outcome: Outcome(s) achieved Date Met:  11/26/17 11/26/17 1203   Pain, Acute (Adult)   Acceptable Pain Control/Comfort Level achieves outcome

## 2017-11-26 NOTE — DISCHARGE SUMMARY
Date of Discharge:  11/26/2017    Discharge Diagnosis:   #1.  Right ureteral obstruction likely secondary to edema status post ureteroscopic laser lithotripsy  #2.  Right mid ureteral calculus-probable nonobstructive  #3.  Right testicular pain that was most likely referred pain from the distal ureteral obstruction  #4.  Hypertension.    Presenting Problem/History of Present Illness  Flank pain [R10.9]       Hospital Course  Patient is a 32 y.o. male presented with severe flank discomfort following removal of the patient's own stent.  CT scan did confirm some evidence of obstruction.  Initially my plan was to replace ureteral stent that the patient did refuse this.  He continued to require parenteral analgesics but on the day of discharge had significant improvement.  He will be sent home on some oral analgesics..  A renal ultrasound to be repeated the next 1-2 weeks when he follows up with Dr. Gonzalez.    Procedures Performed         Consults:   Consults     Date and Time Order Name Status Description    11/24/2017 1903 Inpatient Consult to Hospitalist            Condition on Discharge:  Good    Vital Signs  Temp:  [97.7 °F (36.5 °C)-99.1 °F (37.3 °C)] 97.7 °F (36.5 °C)  Heart Rate:  [] 91  Resp:  [18] 18  BP: (136-150)/() 136/102      Discharge Disposition  Home or Self Care    Discharge Medications   Julien Berg   Home Medication Instructions HOLLEY:[id number]    Printed on:11/26/17 1146   Medication Information                      Escitalopram Oxalate (LEXAPRO PO)  Take 10 mg by mouth Every Morning.             HYDROcodone-acetaminophen (NORCO) 5-325 MG per tablet  Take 1 tablet by mouth Every 6 (Six) Hours As Needed for Moderate Pain  or Severe Pain  for up to 5 days.             lamoTRIgine (LaMICtal) 100 MG tablet  Take 100 mg by mouth Daily.             lisinopril (PRINIVIL,ZESTRIL) 10 MG tablet  Take 10 mg by mouth Every Morning.             naproxen (NAPROSYN) 500 MG tablet  Take 1 tablet  by mouth 2 (Two) Times a Day With Meals.             ondansetron ODT (ZOFRAN-ODT) 4 MG disintegrating tablet  Take 1 tablet by mouth Every 8 (Eight) Hours As Needed for Nausea or Vomiting.             tamsulosin (FLOMAX) 0.4 MG capsule 24 hr capsule  Take 1 capsule by mouth Every Night.                 Discharge Diet:     Activity at Discharge:   Activity Instructions     Discharge Activity Restrictions       1) No driving for 1 day and no longer taking narcotics.   2) Return to school / work in 3 days.  3) May shower / sponge bathe today  4) Do not lift / push / pull more then 10 lbs for 48 hours.                 Follow-up Appointments  Future Appointments  Date Time Provider Department Center   1/2/2018 8:00 AM Louis Gonzalez MD [facility] None     Additional Instructions for the Follow-ups that You Need to Schedule     Call MD With Problems / Concerns    As directed    Instructions: For temperature greater 101, inability to urinate, persistent nausea with vomiting.  You should expect blood-tinged urine for up to 2 weeks intermittently.  Contact us if having difficulty urinating due to the size of blood clots.       Discharge Follow-up with Specified Provider:    As directed    Follow Up Details:  [facility] 1 week. Renal Ultrasound needed prior to discharge                 Test Results Pending at Discharge   Order Current Status    AFP Tumor Marker In process           Zach Wise MD  11/26/17  11:47 AM    Time: Discharge 15 min

## 2017-11-28 LAB — AFP-TM SERPL-MCNC: 1.5 NG/ML (ref 0–8.3)

## 2017-12-01 DIAGNOSIS — N20.1 RIGHT URETERAL STONE: Primary | ICD-10-CM

## 2017-12-11 LAB
LAB AP CASE REPORT: NORMAL
Lab: NORMAL
PATH REPORT.FINAL DX SPEC: NORMAL
PATH REPORT.GROSS SPEC: NORMAL

## 2017-12-20 NOTE — ED PROVIDER NOTES
Subjective   HPI Comments: This is a 32-year-old patient who comes in with complaint of right-sided flank and upper quadrant pain, which he has been having intermittently since he was diagnosed with kidney stone about 2 weeks ago.  Patient has not passed the stone that he knows of and was seen this morning for pain and had laboratory and urinalysis done and was discharged home and comes back that he called his dad because he was almost doubled over with the pain 2 hours ago and dad brought him here and since then the pain has resolved.  Patient is feeling like he needs to urinate and he also has testicular ultrasound done recently which was unremarkable.  Patient has not seen a urologist.  Patient had a kidney stone 3 years ago, which required intervention and at that time the stone was about 11 mm in size    Patient is a 32 y.o. male presenting with flank pain.   History provided by:  Patient   used: No    Flank Pain   Pain location:  RUQ  Pain quality: sharp and throbbing    Pain radiates to:  R flank and RUQ  Pain severity:  No pain  Onset quality:  Sudden  Duration:  2 weeks  Timing:  Sporadic  Progression:  Resolved  Chronicity:  New  Relieved by:  Nothing  Worsened by:  Nothing  Ineffective treatments:  None tried      Review of Systems   Constitutional: Positive for activity change.   HENT: Negative.    Eyes: Negative.    Respiratory: Negative.    Cardiovascular: Negative.    Gastrointestinal: Negative.    Endocrine: Negative.    Genitourinary: Positive for flank pain.   Skin: Negative.    Allergic/Immunologic: Negative.    Neurological: Negative.    Hematological: Negative.    Psychiatric/Behavioral: Negative.    All other systems reviewed and are negative.      Past Medical History:   Diagnosis Date   • ADHD    • Bipolar 1 disorder    • Depression    • Hypertension    • Kidney stone        Allergies   Allergen Reactions   • Penicillins Anaphylaxis       Past Surgical History:   Procedure  Laterality Date   • FL CYSTO URETHROSCOPY W REM FB     • MANDIBLE FRACTURE SURGERY     • URETEROSCOPY LASER LITHOTRIPSY WITH STENT INSERTION Right 11/20/2017    Procedure: CYSTO URETEROSCOPY LASER LITHOTRIPSY WITH STENT INSERTION RETROGRADE PYELOGRAM STONE EXTRACTION;  Surgeon: Louis Gonzalez MD;  Location: [facility] OR;  Service:        Family History   Problem Relation Age of Onset   • Hypertension Mother    • Hypertension Father        Social History     Social History   • Marital status:      Spouse name: N/A   • Number of children: N/A   • Years of education: N/A     Social History Main Topics   • Smoking status: Current Some Day Smoker     Packs/day: 0.50     Years: 20.00     Types: Cigarettes   • Smokeless tobacco: Never Used   • Alcohol use No   • Drug use: No   • Sexual activity: Defer     Other Topics Concern   • Not on file     Social History Narrative           Objective   Physical Exam   Constitutional: He is oriented to person, place, and time. He appears well-developed and well-nourished.   HENT:   Head: Normocephalic and atraumatic.   Right Ear: External ear normal.   Left Ear: External ear normal.   Nose: Nose normal.   Mouth/Throat: Oropharynx is clear and moist.   Eyes: Conjunctivae and EOM are normal. Pupils are equal, round, and reactive to light.   Neck: Normal range of motion. Neck supple.   Cardiovascular: Normal rate, regular rhythm, normal heart sounds and intact distal pulses.    Pulmonary/Chest: Effort normal and breath sounds normal.   Abdominal: Soft.   Complains of  Right colic pain.   Musculoskeletal: Normal range of motion.   Neurological: He is alert and oriented to person, place, and time. He has normal reflexes.   Skin: Skin is warm and dry.   Psychiatric: He has a normal mood and affect. His behavior is normal. Judgment and thought content normal.   Nursing note and vitals reviewed.      Procedures         ED Course  ED Course                  MDM  Number of Diagnoses  or Management Options  Renal colic on right side: new and does not require workup     Amount and/or Complexity of Data Reviewed  Clinical lab tests: ordered and reviewed  Discussion of test results with the performing providers: yes  Obtain history from someone other than the patient: yes  Review and summarize past medical records: yes  Discuss the patient with other providers: yes  Independent visualization of images, tracings, or specimens: yes    Risk of Complications, Morbidity, and/or Mortality  Presenting problems: moderate  Diagnostic procedures: moderate  Management options: moderate    Critical Care  Total time providing critical care: 30-74 minutes    Patient Progress  Patient progress: stable      Final diagnoses:   None            Nano LANDEROS MD  12/20/17 0353       Nano LANDEROS MD  12/20/17 0353

## 2018-04-10 ENCOUNTER — HOSPITAL ENCOUNTER (EMERGENCY)
Facility: HOSPITAL | Age: 33
Discharge: HOME | End: 2018-04-10
Attending: FAMILY MEDICINE | Admitting: FAMILY MEDICINE

## 2018-04-10 VITALS
DIASTOLIC BLOOD PRESSURE: 99 MMHG | WEIGHT: 200 LBS | HEIGHT: 70 IN | RESPIRATION RATE: 18 BRPM | BODY MASS INDEX: 28.63 KG/M2 | SYSTOLIC BLOOD PRESSURE: 148 MMHG | OXYGEN SATURATION: 98 % | HEART RATE: 108 BPM | TEMPERATURE: 98.2 F

## 2018-04-10 DIAGNOSIS — F41.1 ANXIETY REACTION: ICD-10-CM

## 2018-04-10 DIAGNOSIS — K11.5: Primary | ICD-10-CM

## 2018-04-10 PROCEDURE — 99282 EMERGENCY DEPT VISIT SF MDM: CPT

## 2018-04-10 RX ORDER — CITALOPRAM 40 MG/1
40 TABLET ORAL DAILY
COMMUNITY

## 2018-04-10 RX ORDER — METOPROLOL TARTRATE 50 MG/1
50 TABLET, FILM COATED ORAL 2 TIMES DAILY
COMMUNITY

## 2018-04-10 RX ORDER — ESCITALOPRAM OXALATE 10 MG/1
TABLET ORAL
COMMUNITY

## 2018-04-10 NOTE — ED PROVIDER NOTES
Subjective   32-year-old male presents a chief complaint of previous right arm discomfort and intermittent right-sided jaw pressure and discomfort as well as pain.  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  The symptoms resolved after a few seconds.  The patient notes he also feels a pressure in the right side of his jaw that comes and goes intermittently.  He says it feels like a pressure in his jaw and it can be painful and sometimes prevents him from opening his mouth.  He says there is intermittent swelling there.  Patient present for 2 weeks.  He had a dental abscess in the past before but says this does not feel the same.            Review of Systems   All other systems reviewed and are negative.      Past Medical History:   Diagnosis Date   • ADHD    • Bipolar 1 disorder    • Depression    • Hypertension    • Kidney stone        Allergies   Allergen Reactions   • Penicillins Anaphylaxis       Past Surgical History:   Procedure Laterality Date   • FL CYSTO URETHROSCOPY W REM FB     • MANDIBLE FRACTURE SURGERY     • URETEROSCOPY LASER LITHOTRIPSY WITH STENT INSERTION Right 11/20/2017    Procedure: CYSTO URETEROSCOPY LASER LITHOTRIPSY WITH STENT INSERTION RETROGRADE PYELOGRAM STONE EXTRACTION;  Surgeon: Louis Gonzalez MD;  Location: [facility] OR;  Service:        Family History   Problem Relation Age of Onset   • Hypertension Mother    • Hypertension Father        Social History     Social History   • Marital status:      Social History Main Topics   • Smoking status: Current Some Day Smoker     Packs/day: 0.50     Years: 20.00     Types: Cigarettes   • Smokeless tobacco: Never Used   • Alcohol use No   • Drug use: No   • Sexual activity: Defer     Other Topics Concern   • Not on file           Objective   Physical Exam   Constitutional: He  is oriented to person, place, and time. He appears well-developed and well-nourished.   HENT:   Head: Normocephalic and atraumatic.   Eyes: EOM are normal. Pupils are equal, round, and reactive to light.   Neck: Normal range of motion. Neck supple.   Cardiovascular: Normal rate and regular rhythm.    Pulmonary/Chest: Effort normal and breath sounds normal. No respiratory distress.   Abdominal: Soft.   Musculoskeletal: Normal range of motion.   Lymphadenopathy:     He has no cervical adenopathy.   Neurological: He is alert and oriented to person, place, and time. He has normal strength. He displays no tremor and normal reflexes. No cranial nerve deficit or sensory deficit. He exhibits normal muscle tone. He displays a negative Romberg sign. He displays no seizure activity. Coordination and gait normal.   Skin: Skin is warm and dry.   Psychiatric: He has a normal mood and affect. His behavior is normal.   Nursing note and vitals reviewed.      Procedures         ED Course  ED Course                  MDM  Number of Diagnoses or Management Options  Anxiety reaction: new and requires workup  Stone, salivary duct: new and requires workup  Diagnosis management comments: Patient stroke evaluation here is completely normal.  I informed the patient is.  He voices displeasure, he believes that he was just treated here and he felt that his episode of numbness in his right arm during his guitar session are not being taken seriously.  I informed her we are taking him seriously but his stroke scale is normal I do not believes that he suffered from a stroke.  The patient is also not happy when I had told him that there are not very many pathologies that could be in the right side of his jaw they come and go and feel like swelling except for a possible salivary stone.  The patient is not receptive to this diagnosis and voices his pressure once again and has notable agitation.  The patient also demonstrates agressive body language  during the examination.  After I informed the patient of his diagnosis and that I felt he did not have a stroke he wishes to leave immediately.  I informed him he needs waiting for me to make a note on him before he can go      Risk of Complications, Morbidity, and/or Mortality  Presenting problems: moderate  Diagnostic procedures: moderate  Management options: moderate    Patient Progress  Patient progress: stable      Final diagnoses:   Stone, salivary duct   Anxiety reaction            Edward Sewell PA-C  04/10/18 0304

## 2019-06-07 ENCOUNTER — TRANSCRIBE ORDERS (OUTPATIENT)
Dept: ADMINISTRATIVE | Facility: HOSPITAL | Age: 34
End: 2019-06-07

## 2019-06-07 ENCOUNTER — APPOINTMENT (OUTPATIENT)
Dept: LAB | Facility: HOSPITAL | Age: 34
End: 2019-06-07

## 2019-06-07 DIAGNOSIS — I10 ESSENTIAL HYPERTENSION, MALIGNANT: Primary | ICD-10-CM

## 2019-06-07 LAB
ALBUMIN SERPL-MCNC: 4.3 G/DL (ref 3.5–5)
ALBUMIN/GLOB SERPL: 1.4 G/DL (ref 1.1–2.5)
ALP SERPL-CCNC: 55 U/L (ref 24–120)
ALT SERPL W P-5'-P-CCNC: 47 U/L (ref 0–54)
ANION GAP SERPL CALCULATED.3IONS-SCNC: 8 MMOL/L (ref 4–13)
AST SERPL-CCNC: 31 U/L (ref 7–45)
AUTO MIXED CELLS #: 0.7 10*3/MM3 (ref 0.1–2.6)
AUTO MIXED CELLS %: 5.7 % (ref 0.1–24)
BILIRUB SERPL-MCNC: 0.3 MG/DL (ref 0.1–1)
BILIRUB UR QL STRIP: NEGATIVE
BUN BLD-MCNC: 14 MG/DL (ref 5–21)
BUN/CREAT SERPL: 14.4
CALCIUM SPEC-SCNC: 9.4 MG/DL (ref 8.4–10.4)
CHLORIDE SERPL-SCNC: 102 MMOL/L (ref 98–110)
CHOLEST SERPL-MCNC: 175 MG/DL (ref 130–200)
CLARITY UR: CLEAR
CO2 SERPL-SCNC: 31 MMOL/L (ref 24–31)
COLOR UR: YELLOW
CREAT BLD-MCNC: 0.97 MG/DL (ref 0.5–1.4)
ERYTHROCYTE [DISTWIDTH] IN BLOOD BY AUTOMATED COUNT: 12.5 % (ref 12–15)
GFR SERPL CREATININE-BSD FRML MDRD: 88 ML/MIN/1.73
GLOBULIN UR ELPH-MCNC: 3 GM/DL
GLUCOSE BLD-MCNC: 99 MG/DL (ref 70–100)
GLUCOSE UR STRIP-MCNC: NEGATIVE MG/DL
HBA1C MFR BLD: 5.6 %
HCT VFR BLD AUTO: 46.6 % (ref 40–52)
HDLC SERPL-MCNC: 36 MG/DL
HGB BLD-MCNC: 16 G/DL (ref 14–18)
HGB UR QL STRIP.AUTO: NEGATIVE
KETONES UR QL STRIP: NEGATIVE
LDLC SERPL CALC-MCNC: 88 MG/DL (ref 0–99)
LDLC/HDLC SERPL: 2.44 {RATIO}
LEUKOCYTE ESTERASE UR QL STRIP.AUTO: NEGATIVE
LYMPHOCYTES # BLD AUTO: 2.4 10*3/MM3 (ref 0.7–3.1)
LYMPHOCYTES NFR BLD AUTO: 19.8 % (ref 15–45)
MCH RBC QN AUTO: 30.1 PG (ref 28–32)
MCHC RBC AUTO-ENTMCNC: 34.3 G/DL (ref 33–36)
MCV RBC AUTO: 87.6 FL (ref 82–95)
NEUTROPHILS # BLD AUTO: 9.1 10*3/MM3 (ref 1.5–8.3)
NEUTROPHILS NFR BLD AUTO: 74.5 % (ref 39–78)
NITRITE UR QL STRIP: NEGATIVE
PH UR STRIP.AUTO: 6.5 [PH] (ref 5–8)
PLATELET # BLD AUTO: 266 10*3/MM3 (ref 130–400)
PMV BLD AUTO: 9 FL (ref 6–12)
POTASSIUM BLD-SCNC: 4.5 MMOL/L (ref 3.5–5.3)
PROT SERPL-MCNC: 7.3 G/DL (ref 6.3–8.7)
PROT UR QL STRIP: NEGATIVE
RBC # BLD AUTO: 5.32 10*6/MM3 (ref 4.2–5.4)
SODIUM BLD-SCNC: 141 MMOL/L (ref 135–145)
SP GR UR STRIP: 1.01 (ref 1–1.03)
TRIGL SERPL-MCNC: 255 MG/DL (ref 0–149)
TSH SERPL DL<=0.05 MIU/L-ACNC: 1.01 MIU/ML (ref 0.47–4.68)
UROBILINOGEN UR QL STRIP: NORMAL
VLDLC SERPL-MCNC: 51 MG/DL
WBC NRBC COR # BLD: 12.2 10*3/MM3 (ref 4.8–10.8)

## 2019-06-07 PROCEDURE — 80053 COMPREHEN METABOLIC PANEL: CPT | Performed by: NURSE PRACTITIONER

## 2019-06-07 PROCEDURE — 85025 COMPLETE CBC W/AUTO DIFF WBC: CPT | Performed by: NURSE PRACTITIONER

## 2019-06-07 PROCEDURE — 84443 ASSAY THYROID STIM HORMONE: CPT | Performed by: NURSE PRACTITIONER

## 2019-06-07 PROCEDURE — 80061 LIPID PANEL: CPT | Performed by: NURSE PRACTITIONER

## 2019-06-07 PROCEDURE — 36415 COLL VENOUS BLD VENIPUNCTURE: CPT | Performed by: NURSE PRACTITIONER

## 2019-06-07 PROCEDURE — 83036 HEMOGLOBIN GLYCOSYLATED A1C: CPT | Performed by: NURSE PRACTITIONER

## 2019-06-07 PROCEDURE — 81003 URINALYSIS AUTO W/O SCOPE: CPT | Performed by: NURSE PRACTITIONER

## 2019-09-13 ENCOUNTER — OFFICE VISIT (OUTPATIENT)
Dept: RETAIL CLINIC | Facility: CLINIC | Age: 34
End: 2019-09-13

## 2019-09-13 VITALS
DIASTOLIC BLOOD PRESSURE: 94 MMHG | BODY MASS INDEX: 29.33 KG/M2 | SYSTOLIC BLOOD PRESSURE: 136 MMHG | OXYGEN SATURATION: 97 % | HEART RATE: 100 BPM | TEMPERATURE: 98.3 F | WEIGHT: 204.4 LBS

## 2019-09-13 DIAGNOSIS — Z76.0 ENCOUNTER FOR MEDICATION REFILL: ICD-10-CM

## 2019-09-13 DIAGNOSIS — I10 ESSENTIAL HYPERTENSION: Primary | ICD-10-CM

## 2019-09-13 PROCEDURE — 99213 OFFICE O/P EST LOW 20 MIN: CPT | Performed by: NURSE PRACTITIONER

## 2019-09-13 RX ORDER — LAMOTRIGINE 150 MG/1
150 TABLET ORAL 2 TIMES DAILY
Refills: 2 | COMMUNITY
Start: 2019-08-07

## 2019-09-13 RX ORDER — METOPROLOL SUCCINATE 25 MG/1
25 TABLET, EXTENDED RELEASE ORAL DAILY
Qty: 30 TABLET | Refills: 0 | Status: SHIPPED | OUTPATIENT
Start: 2019-09-13 | End: 2019-10-13

## 2019-09-13 RX ORDER — PAROXETINE HYDROCHLORIDE 20 MG/1
TABLET, FILM COATED ORAL
Refills: 3 | COMMUNITY
Start: 2019-08-20

## 2019-09-13 RX ORDER — CLONIDINE HYDROCHLORIDE 0.1 MG/1
0.1 TABLET ORAL
Refills: 0 | COMMUNITY
Start: 2019-06-06

## 2019-09-13 RX ORDER — METOPROLOL SUCCINATE 25 MG/1
TABLET, EXTENDED RELEASE ORAL
COMMUNITY
End: 2019-09-13 | Stop reason: SDUPTHER

## 2019-09-13 RX ORDER — TRAZODONE HYDROCHLORIDE 150 MG/1
TABLET ORAL
Refills: 3 | COMMUNITY
Start: 2019-08-28

## 2019-09-13 NOTE — PROGRESS NOTES
"Irene Berg is a 33 y.o. male.     Hypertension   This is a chronic problem. The current episode started more than 1 year ago (\"since high school\" but not addressed until 2013 or 2014. ). The problem is unchanged. The problem is controlled (Reports 130-140/80-90 when on metoprolol consisently). Associated symptoms include palpitations (Over the past few days; \"galloping for a couple of seconds\") and shortness of breath (When he goes to stand up quick he might feel short of breath or dizzy for a few seconds, then resolved on its own. ). Pertinent negatives include no chest pain or neck pain. Risk factors for coronary artery disease include male gender, smoking/tobacco exposure and family history. Past treatments include ACE inhibitors (Lisinopril up to 40mg/day- lead to coughing). Current antihypertension treatment includes beta blockers (Metoprolol). Compliance problems: Has been 4 days without medication.  Went to PCP office yesteday and waited 3 hours for walk-in spot.  Didn't want to wait anymore.     He plans to start his care with a primary provider near [place] who is more familiar with his family history.      The following portions of the patient's history were reviewed and updated as appropriate: allergies, current medications, past family history, past medical history, past social history, past surgical history and problem list.    Review of Systems   Constitutional: Negative for fever.   Respiratory: Positive for shortness of breath (When he goes to stand up quick he might feel short of breath or dizzy for a few seconds, then resolved on its own. ).    Cardiovascular: Positive for palpitations (Over the past few days; \"galloping for a couple of seconds\"). Negative for chest pain and leg swelling.   Gastrointestinal: Negative for diarrhea, nausea and vomiting.   Musculoskeletal: Negative for neck pain.   Neurological: Negative for headache.   Psychiatric/Behavioral:        Has history of " "anxiety/depression.  States if he feels the \"galloping\" in his chest he may start to feel anxious, which then contributes to shortness of breath symptoms.        Objective   Physical Exam   Constitutional: He appears well-developed and well-nourished. He does not appear ill. No distress.   Eyes: Conjunctivae are normal. Pupils are equal, round, and reactive to light.   Neck: Neck supple.   Cardiovascular: Normal rate, regular rhythm, normal heart sounds and intact distal pulses. Exam reveals no gallop and no friction rub.   No murmur heard.  Pulses:       Radial pulses are 2+ on the right side, and 2+ on the left side.        Posterior tibial pulses are 2+ on the right side, and 2+ on the left side.   Pulmonary/Chest: Effort normal and breath sounds normal. No stridor. No respiratory distress. He has no decreased breath sounds. He has no wheezes. He has no rhonchi. He has no rales.   Musculoskeletal:   No edema noted to bilateral lower extremities   Lymphadenopathy:     He has no cervical adenopathy.   Neurological: He is alert.   Skin: Skin is warm and dry. No rash noted. He is not diaphoretic.   Psychiatric: He has a normal mood and affect. His speech is normal and behavior is normal. Thought content normal.         Assessment/Plan   Julien was seen today for med refill.    Diagnoses and all orders for this visit:    Essential hypertension    Encounter for medication refill    Other orders  -     metoprolol succinate XL (TOPROL-XL) 25 MG 24 hr tablet; Take 1 tablet by mouth Daily for 30 days.    Able to see last fill of Metoprolol XL 25mg ordered from [facility] and filled with [facility] 8/20/19.     Patient reports \"galloping\" has only started in the last couple of days since being off of his medication.  He denies chest pain and states they resolve on their own.  He admits to a lot of caffeine intake.  Instructed to stay off of caffeine, avoid energy drinks and restart medication.  Schedule with " primary provider and be sure to inform him of these symptoms.  Instructed to go to ER if they worsen, will not resolve, or experiences symptoms related to them.        Monitor BP while at home and log  Schedule with primary provider to discuss readings  Avoid decongestants  Decrease your sodium intake  Avoid caffeine and energy drinks that could be contributing to palpitations. Make sure you follow up with primary doctor regarding this concerns if it continues.   If you experience chest pain, palpitations, shortness of breath, vision difficulties, bad headache, confusion, or if BP is elevated and you are unable to get it down, go to ER.

## 2019-09-13 NOTE — PATIENT INSTRUCTIONS
"Monitor BP while at home and log  Schedule with primary provider to discuss readings  Avoid decongestants  Decrease your sodium intake  Avoid caffeine and energy drinks that could be contributing to palpitations. Make sure you follow up with primary doctor regarding this concerns if it continues.   If you experience chest pain, palpitations, shortness of breath, vision difficulties, bad headache, confusion, or if BP is elevated and you are unable to get it down, go to ER.           Hypertension  Hypertension, commonly called high blood pressure, is when the force of blood pumping through the arteries is too strong. The arteries are the blood vessels that carry blood from the heart throughout the body. Hypertension forces the heart to work harder to pump blood and may cause arteries to become narrow or stiff. Having untreated or uncontrolled hypertension can cause heart attacks, strokes, kidney disease, and other problems.  A blood pressure reading consists of a higher number over a lower number. Ideally, your blood pressure should be below 120/80. The first (\"top\") number is called the systolic pressure. It is a measure of the pressure in your arteries as your heart beats. The second (\"bottom\") number is called the diastolic pressure. It is a measure of the pressure in your arteries as the heart relaxes.  What are the causes?  The cause of this condition is not known.  What increases the risk?  Some risk factors for high blood pressure are under your control. Others are not.  Factors you can change  · Smoking.  · Having type 2 diabetes mellitus, high cholesterol, or both.  · Not getting enough exercise or physical activity.  · Being overweight.  · Having too much fat, sugar, calories, or salt (sodium) in your diet.  · Drinking too much alcohol.  Factors that are difficult or impossible to change  · Having chronic kidney disease.  · Having a family history of high blood pressure.  · Age. Risk increases with " age.  · Race. You may be at higher risk if you are -American.  · Gender. Men are at higher risk than women before age 45. After age 65, women are at higher risk than men.  · Having obstructive sleep apnea.  · Stress.  What are the signs or symptoms?  Extremely high blood pressure (hypertensive crisis) may cause:  · Headache.  · Anxiety.  · Shortness of breath.  · Nosebleed.  · Nausea and vomiting.  · Severe chest pain.  · Jerky movements you cannot control (seizures).  How is this diagnosed?  This condition is diagnosed by measuring your blood pressure while you are seated, with your arm resting on a surface. The cuff of the blood pressure monitor will be placed directly against the skin of your upper arm at the level of your heart. It should be measured at least twice using the same arm. Certain conditions can cause a difference in blood pressure between your right and left arms.  Certain factors can cause blood pressure readings to be lower or higher than normal (elevated) for a short period of time:  · When your blood pressure is higher when you are in a health care provider's office than when you are at home, this is called white coat hypertension. Most people with this condition do not need medicines.  · When your blood pressure is higher at home than when you are in a health care provider's office, this is called masked hypertension. Most people with this condition may need medicines to control blood pressure.  If you have a high blood pressure reading during one visit or you have normal blood pressure with other risk factors:  · You may be asked to return on a different day to have your blood pressure checked again.  · You may be asked to monitor your blood pressure at home for 1 week or longer.  If you are diagnosed with hypertension, you may have other blood or imaging tests to help your health care provider understand your overall risk for other conditions.  How is this treated?  This condition is  treated by making healthy lifestyle changes, such as eating healthy foods, exercising more, and reducing your alcohol intake. Your health care provider may prescribe medicine if lifestyle changes are not enough to get your blood pressure under control, and if:  · Your systolic blood pressure is above 130.  · Your diastolic blood pressure is above 80.  Your personal target blood pressure may vary depending on your medical conditions, your age, and other factors.  Follow these instructions at home:  Eating and drinking    · Eat a diet that is high in fiber and potassium, and low in sodium, added sugar, and fat. An example eating plan is called the DASH (Dietary Approaches to Stop Hypertension) diet. To eat this way:  ? Eat plenty of fresh fruits and vegetables. Try to fill half of your plate at each meal with fruits and vegetables.  ? Eat whole grains, such as whole wheat pasta, brown rice, or whole grain bread. Fill about one quarter of your plate with whole grains.  ? Eat or drink low-fat dairy products, such as skim milk or low-fat yogurt.  ? Avoid fatty cuts of meat, processed or cured meats, and poultry with skin. Fill about one quarter of your plate with lean proteins, such as fish, chicken without skin, beans, eggs, and tofu.  ? Avoid premade and processed foods. These tend to be higher in sodium, added sugar, and fat.  · Reduce your daily sodium intake. Most people with hypertension should eat less than 1,500 mg of sodium a day.  · Limit alcohol intake to no more than 1 drink a day for nonpregnant women and 2 drinks a day for men. One drink equals 12 oz of beer, 5 oz of wine, or 1½ oz of hard liquor.  Lifestyle    · Work with your health care provider to maintain a healthy body weight or to lose weight. Ask what an ideal weight is for you.  · Get at least 30 minutes of exercise that causes your heart to beat faster (aerobic exercise) most days of the week. Activities may include walking, swimming, or  biking.  · Include exercise to strengthen your muscles (resistance exercise), such as pilates or lifting weights, as part of your weekly exercise routine. Try to do these types of exercises for 30 minutes at least 3 days a week.  · Do not use any products that contain nicotine or tobacco, such as cigarettes and e-cigarettes. If you need help quitting, ask your health care provider.  · Monitor your blood pressure at home as told by your health care provider.  · Keep all follow-up visits as told by your health care provider. This is important.  Medicines  · Take over-the-counter and prescription medicines only as told by your health care provider. Follow directions carefully. Blood pressure medicines must be taken as prescribed.  · Do not skip doses of blood pressure medicine. Doing this puts you at risk for problems and can make the medicine less effective.  · Ask your health care provider about side effects or reactions to medicines that you should watch for.  Contact a health care provider if:  · You think you are having a reaction to a medicine you are taking.  · You have headaches that keep coming back (recurring).  · You feel dizzy.  · You have swelling in your ankles.  · You have trouble with your vision.  Get help right away if:  · You develop a severe headache or confusion.  · You have unusual weakness or numbness.  · You feel faint.  · You have severe pain in your chest or abdomen.  · You vomit repeatedly.  · You have trouble breathing.  Summary  · Hypertension is when the force of blood pumping through your arteries is too strong. If this condition is not controlled, it may put you at risk for serious complications.  · Your personal target blood pressure may vary depending on your medical conditions, your age, and other factors. For most people, a normal blood pressure is less than 120/80.  · Hypertension is treated with lifestyle changes, medicines, or a combination of both. Lifestyle changes include  weight loss, eating a healthy, low-sodium diet, exercising more, and limiting alcohol.  This information is not intended to replace advice given to you by your health care provider. Make sure you discuss any questions you have with your health care provider.  Document Released: 12/18/2006 Document Revised: 11/15/2017 Document Reviewed: 11/15/2017  [employer] Patient Education © 2019 [employer]

## 2020-02-17 ENCOUNTER — HOSPITAL ENCOUNTER (EMERGENCY)
Age: 35
Discharge: HOME | End: 2020-02-17
Payer: COMMERCIAL

## 2020-02-17 VITALS
SYSTOLIC BLOOD PRESSURE: 136 MMHG | OXYGEN SATURATION: 94 % | TEMPERATURE: 98.5 F | WEIGHT: 180 LBS | BODY MASS INDEX: 26.58 KG/M2 | HEART RATE: 90 BPM | RESPIRATION RATE: 17 BRPM | DIASTOLIC BLOOD PRESSURE: 85 MMHG

## 2020-02-17 PROCEDURE — 99282 EMERGENCY DEPT VISIT SF MDM: CPT

## 2020-02-17 RX ORDER — LIDOCAINE HYDROCHLORIDE 10 MG/ML
5 INJECTION, SOLUTION EPIDURAL; INFILTRATION; INTRACAUDAL; PERINEURAL ONCE
Status: DISCONTINUED | OUTPATIENT
Start: 2020-02-17 | End: 2020-02-17 | Stop reason: HOSPADM

## 2020-02-17 RX ORDER — DOXYCYCLINE HYCLATE 100 MG
100 TABLET ORAL 2 TIMES DAILY
Qty: 20 TABLET | Refills: 0 | Status: SHIPPED | OUTPATIENT
Start: 2020-02-17 | End: 2020-02-27

## 2020-02-17 RX ORDER — METOPROLOL SUCCINATE 25 MG/1
TABLET, EXTENDED RELEASE ORAL
COMMUNITY

## 2020-02-17 ASSESSMENT — ENCOUNTER SYMPTOMS
NAUSEA: 0
WHEEZING: 0
CHEST TIGHTNESS: 0
APNEA: 0
COLOR CHANGE: 0
SHORTNESS OF BREATH: 0
SORE THROAT: 0
BLOOD IN STOOL: 0
STRIDOR: 0
BACK PAIN: 0
EYE DISCHARGE: 0
EYE PAIN: 0
SINUS PAIN: 0
ABDOMINAL DISTENTION: 0
EYE REDNESS: 0
DIARRHEA: 0
PHOTOPHOBIA: 0
VOMITING: 0
ABDOMINAL PAIN: 0
CONSTIPATION: 0
RECTAL PAIN: 0

## 2020-02-18 NOTE — ED PROVIDER NOTES
[facility] EMERGENCY DEPT  eMERGENCYdEPARTMENT eNCOUnter      Pt Name: Meagan Carney  MRN: [id number]  [place] 1985  Date of evaluation: 2/17/2020  LONG Chilel - DARRIAN    CHIEF COMPLAINT       Chief Complaint   Patient presents with    Other     wart on right foot         HISTORY OF PRESENT ILLNESS  (Location/Symptom, Timing/Onset, Context/Setting, Quality, Duration, Modifying Factors, Severity.)   Meagan Carney is a 37 y.o. male who presents to the emergency department for evaluation of a bleeding plantar wart on the right foot. Patient says he had applied duct tape to try to get rid of the wart and when he tore it off part of the wart had ripped off and is hanging and bleeding. Patient is unsure when last tetanus vaccine was administered. Denies any other symptoms. The history is provided by the patient. Nursing Notes were reviewed and I agree. REVIEW OF SYSTEMS    (2-9 systems for level 4, 10 or more for level 5)     Review of Systems   Constitutional: Negative for activity change, appetite change, chills, diaphoresis, fatigue and fever. HENT: Negative for congestion, ear pain, nosebleeds, sinus pain and sore throat. Eyes: Negative for photophobia, pain, discharge and redness. Respiratory: Negative for apnea, chest tightness, shortness of breath, wheezing and stridor. Cardiovascular: Negative for chest pain, palpitations and leg swelling. Gastrointestinal: Negative for abdominal distention, abdominal pain, blood in stool, constipation, diarrhea, nausea, rectal pain and vomiting. Endocrine: Negative for cold intolerance, heat intolerance, polydipsia, polyphagia and polyuria. Genitourinary: Negative for decreased urine volume, difficulty urinating, dysuria, flank pain and urgency. Musculoskeletal: Negative for arthralgias, back pain, joint swelling, neck pain and neck stiffness. Skin: Positive for wound (Bleeding Plantar wart to the right foot).  Negative for color Cardiovascular:      Rate and Rhythm: Normal rate and regular rhythm. Pulses: Normal pulses. Heart sounds: Normal heart sounds. No murmur. No friction rub. No gallop. Pulmonary:      Effort: Pulmonary effort is normal. No respiratory distress. Breath sounds: Normal breath sounds. No stridor. No wheezing, rhonchi or rales. Chest:      Chest wall: No tenderness. Abdominal:      General: Bowel sounds are normal. There is no distension. Palpations: Abdomen is soft. There is no mass. Tenderness: There is no abdominal tenderness. There is no guarding or rebound. Hernia: No hernia is present. Musculoskeletal: Normal range of motion. General: No tenderness or deformity. Feet:    Skin:     General: Skin is warm and dry. Capillary Refill: Capillary refill takes less than 2 seconds. Findings: No erythema or rash. Neurological:      General: No focal deficit present. Mental Status: He is alert and oriented to person, place, and time. Cranial Nerves: No cranial nerve deficit. Coordination: Coordination normal.   Psychiatric:         Mood and Affect: Mood normal.         Behavior: Behavior normal.           DIAGNOSTIC RESULTS     RADIOLOGY:   Non-plain film images such as CT, Ultrasound and MRI are read by the radiologist. Plain radiographic images are visualized and preliminarilyinterpreted by No att. providers found with the below findings:        Interpretation per the Radiologist below, if available at the time of this note:    No orders to display       LABS:  Labs Reviewed - No data to display    All other labs were within normal range or notreturned as of this dictation.     RE-ASSESSMENT          EMERGENCY DEPARTMENT COURSE and DIFFERENTIAL DIAGNOSIS/MDM:   Vitals:    Vitals:    02/17/20 1551   BP: 136/85   Pulse: 90   Resp: 17   Temp: 98.5 °F (36.9 °C)   TempSrc: Oral   SpO2: 94%   Weight: 180 lb (81.6 kg)         MDM  Number of Diagnoses worsen      DISCHARGE MEDICATIONS:  Discharge Medication List as of 2/17/2020  5:19 PM      START taking these medications    Details   doxycycline hyclate (VIBRA-TABS) 100 MG tablet Take 1 tablet by mouth 2 times daily for 10 days, Disp-20 tablet, R-0Print             (Please note that portions of this note were completed with a voice recognition program.  Efforts were made to edit the dictations but occasionallywords are mis-transcribed.)    LONG Egan NP, APRN - NP  02/17/20 [id number]

## 2022-10-28 NOTE — PAYOR COMM NOTE
"ADMIT INPT 11-24-17  DC HOME 11-26-17  UR PHONE [phone]   [phone]    [id number]  Bennie Berg (32 y.o. Male)     Date of Birth Social Security Number Address Home Phone MRN    [id number]  [address] [facility] [address] [phone] [id number]    Scientologist Marital Status          None        Admission Date Admission Type Admitting Provider Attending Provider Department, Room/Bed    11/24/17 Emergency Zach Wise MD  [facility] 3C, 381/1    Discharge Date Discharge Disposition Discharge Destination        11/26/2017 Home or Self Care             Attending Provider: (none)    Allergies:  Penicillins    Isolation:  None   Infection:  None   Code Status:  Prior    Ht:  69\" (175.3 cm)   Wt:  187 lb (84.8 kg)    Admission Cmt:  None   Principal Problem:  None                Active Insurance as of 11/24/2017     Primary Coverage     Payor Plan Insurance Group Employer/Plan Group    [employer]HE[employer] MEDICAID AN[place]M MEDICAID [id number]     Payor Plan Address Payor Plan Phone Number Effective From Effective To    [address] [address] [phone] 3/1/2014     [place] [address]       Subscriber Name Subscriber Birth Date Member ID       BENNIE BERG 1985 [id number]                 Emergency Contacts      (Rel.) Home Phone Work Phone Mobile Phone    Magali Hunt (Relative) [phone] -- --               History & Physical      Zach Wise MD at 11/24/2017  7:17 PM          Urology H&P    Mr. Berg is 32 y.o. male    CHIEF COMPLAINT: I have pain after taking out my stent.    HPI  This is a 32-year-old white male who developed the very sudden and moderately severe pain in the right upper abdomen about 8 hours ago.  This occurred in the context of having removed his own ureteral stent which followed right ureteroscopic laser lithotripsy with stone basket extraction and stent placement by Dr. Louis Gonzalez 4 days ago.  It is not associated with nausea " vomiting or significant hematuria.  He has received relief with parenteral analgesics.  He does complain also of constipation.  The latter is affected by him having to take oral opiate therapy for his postoperative convalescence.    The following portions of the patient's history were reviewed and updated as appropriate: allergies, current medications, past family history, past medical history, past social history, past surgical history and problem list.    Review of Systems   Constitutional: Negative for appetite change and fever.   HENT: Negative for hearing loss and sore throat.    Eyes: Negative for pain and redness.   Respiratory: Negative for cough and shortness of breath.    Cardiovascular: Negative for chest pain and leg swelling.   Gastrointestinal: Positive for abdominal pain. Negative for anal bleeding, nausea and vomiting.   Endocrine: Negative for cold intolerance and heat intolerance.   Genitourinary: Negative for dysuria, flank pain and hematuria.   Musculoskeletal: Negative for joint swelling and myalgias.   Skin: Negative for color change and rash.   Allergic/Immunologic: Negative for immunocompromised state.   Neurological: Negative for dizziness and speech difficulty.   Hematological: Negative for adenopathy. Does not bruise/bleed easily.   Psychiatric/Behavioral: Negative for dysphoric mood and suicidal ideas.       Prescriptions Prior to Admission   Medication Sig Dispense Refill Last Dose   • Escitalopram Oxalate (LEXAPRO PO) Take 10 mg by mouth Every Morning.   11/24/2017 at 0800   • HYDROcodone-acetaminophen (NORCO) 5-325 MG per tablet Take 1 tablet by mouth Every 4 (Four) Hours As Needed for Moderate Pain  or Severe Pain . 18 tablet 0 11/24/2017 at 1300   • lamoTRIgine (LaMICtal) 100 MG tablet Take 100 mg by mouth Daily.   11/24/2017 at Unknown time   • lisinopril (PRINIVIL,ZESTRIL) 10 MG tablet Take 10 mg by mouth Every Morning.   11/24/2017 at 0800   • Methylphenidate HCl ER (CONCERTA) 18  MG CR tablet Take 36 mg by mouth Every Morning.   2017   • naproxen (NAPROSYN) 500 MG tablet Take 1 tablet by mouth 2 (Two) Times a Day With Meals. 20 tablet 0 2017 at Unknown time   • ondansetron ODT (ZOFRAN-ODT) 4 MG disintegrating tablet Take 1 tablet by mouth Every 8 (Eight) Hours As Needed for Nausea or Vomiting. 10 tablet 0 2017 at Unknown time   • phenazopyridine (PYRIDIUM) 100 MG tablet Take 1 tablet by mouth 3 (Three) Times a Day As Needed (dysuria). 12 tablet 0 2017 at Unknown time   • tamsulosin (FLOMAX) 0.4 MG capsule 24 hr capsule Take 1 capsule by mouth Every Night. 14 capsule 0 2017 at Unknown time   • [] ciprofloxacin (CIPRO) 500 MG tablet Take 1 tablet by mouth 2 (Two) Times a Day for 3 days. 6 tablet 0          Current Facility-Administered Medications:   •  HYDROmorphone (DILAUDID) injection 0.5 mg, 0.5 mg, Intravenous, Q2H PRN, Zach Wise MD  •  labetalol (NORMODYNE,TRANDATE) injection 20 mg, 20 mg, Intravenous, Once, Wallace Garcia MD  •  ondansetron (ZOFRAN) injection 4 mg, 4 mg, Intravenous, Q8H PRN, Zach Wise MD  •  Insert peripheral IV, , , Once **AND** sodium chloride 0.9 % flush 10 mL, 10 mL, Intravenous, PRN, Alina Durham MD    Past Medical History:   Diagnosis Date   • ADHD    • Bipolar 1 disorder    • Depression    • Hypertension    • Kidney stone        Past Surgical History:   Procedure Laterality Date   • FL CYSTO URETHROSCOPY W REM FB     • MANDIBLE FRACTURE SURGERY     • URETEROSCOPY LASER LITHOTRIPSY WITH STENT INSERTION Right 2017    Procedure: CYSTO URETEROSCOPY LASER LITHOTRIPSY WITH STENT INSERTION RETROGRADE PYELOGRAM STONE EXTRACTION;  Surgeon: Louis Gonzalez MD;  Location: [facility] OR;  Service:        Social History     Social History   • Marital status:      Spouse name: N/A   • Number of children: N/A   • Years of education: N/A     Social History Main Topics   • Smoking status: Current Some Day  "Smoker     Packs/day: 0.50     Years: 20.00     Types: Cigarettes   • Smokeless tobacco: Never Used   • Alcohol use No   • Drug use: No   • Sexual activity: Defer     Other Topics Concern   • None     Social History Narrative       Family History   Problem Relation Age of Onset   • Hypertension Mother    • Hypertension Father        BP (!) 154/108 (BP Location: Left arm, Patient Position: Sitting)  Pulse 98  Temp 98.2 °F (36.8 °C) (Oral)   Resp 16  Ht 69\" (175.3 cm)  Wt 187 lb (84.8 kg)  SpO2 100%  BMI 27.62 kg/m2    Physical Exam  Constitutional: Well nourished, Well developed; No apparent distress; Vital reviewed as above  Psychiatric: Appropriate affect; Alert and oriented  Eyes: Unremarkable  Musculoskeletal: Normal gait and station  GI: Abdomen is soft, without distention.  He has some mild tenderness in the right upper quadrant and right epigastrium.  There is no CVA tenderness over the right kidney.  Respiratory: No distress; Unlabored movement; No accessory musculature needed with symmetric movements  Skin: No pallor or diaphoresis  Lymphatic: No adenopathy neck or groin    Lab Results   Component Value Date    GLUCOSE 106 (H) 11/24/2017    BUN 13 11/24/2017    CREATININE 1.02 11/24/2017    EGFRIFNONA 84 11/24/2017    BCR 12.7 11/24/2017    CO2 33.0 (H) 11/24/2017    CALCIUM 9.2 11/24/2017    ALBUMIN 4.00 11/24/2017    LABIL2 1.5 11/24/2017    AST 46 (H) 11/24/2017     (H) 11/24/2017     Lab Results   Component Value Date    GLUCOSE 106 (H) 11/24/2017    CALCIUM 9.2 11/24/2017     11/24/2017    K 4.0 11/24/2017    CO2 33.0 (H) 11/24/2017     11/24/2017    BUN 13 11/24/2017    CREATININE 1.02 11/24/2017    EGFRIFNONA 84 11/24/2017    BCR 12.7 11/24/2017    ANIONGAP 10.0 11/24/2017     Lab Results   Component Value Date    WBC 12.71 (H) 11/24/2017    HGB 13.6 (L) 11/24/2017    HCT 40.6 11/24/2017    MCV 86.9 11/24/2017     11/24/2017     No results found for: PSA  No results " Detail Level: Detailed Quality 130: Documentation Of Current Medications In The Medical Record: Current Medications Documented found for: URINECX  Brief Urine Lab Results  (Last result in the past 365 days)      Color   Clarity   Blood   Leuk Est   Nitrite   Protein   CREAT   Urine HCG        11/24/17 1606 Orange(A) Turbid(A) Large (3+)(A) Small (1+)(A) Negative >=300 mg/dL (3+)(A)               Independent renal ultrasound review  The renal ultrasound is available for me to review.  Treatment recommendations require an independent review.  This film has been reviewed by the radiologist to determine any non urologic abnormalities that are presents.  However, I very closely inspected the kidneys for size, symmetry, contour, parenchymal thickness, perinephric reaction, presence of calcifications, and intrarenal dilation of the collecting system.  This US shows No evidence of stone but there is mild hydronephrosis noted on the right side consistent with ureteral obstruction.      Assessment and Plan  #1.  Right ureteral obstruction following stent removal  #2.  Hypertension  #3. Elevated transaminases    Patient is admitted with abdominal pain and mild ureteral obstruction following removing his stent earlier today.  He did follow appropriate instructions by Dr. Gonzalez.  His pain has been controlled with analgesic therapy.  I do think that some of his epigastric discomfort is due to some constipation.    We will give IV parenteral analgesics as indicated.  We will also add a nonsteroidal anti-inflammatory drug.  If his epigastric pain worsened we would stop this.  I will put him on a when necessary antiacid as well as Prevacid.  I will also treat his constipation with MiraLAX and milk of magnesia.    He is tachycardic, has a mild leukocytosis and abnormal urinalysis which meets sepsis criteria but this patient has absolutely no other clinical signs based on my examination and review of findings.  We will await cultures.  Would start broad-spectrum antibiotics should he develop fever or worsening symptoms.    I would ask that the hospitalist  Quality 431: Preventive Care And Screening: Unhealthy Alcohol Use - Screening: Patient not identified as an unhealthy alcohol user when screened for unhealthy alcohol use using a systematic screening method service see the patient with regards to his hypertension.  I think his pain is well controlled now and he says he has difficulty controlling this at home even though he takes an antihypertensive. Would also appreciate Dr. Garcia's recommendations on his elevated transaminases.       Zach Wise MD  11/24/17  7:17 PM               Electronically signed by Zach Wise MD at 11/24/2017  7:31 PM           Emergency Department Notes      Alina Durham MD at 11/24/2017  3:00 PM          Subjective patient is a 32-year-old male who presents to the ER with abdominal pain.  Patient was recently diagnosed with a right ureteral stone.  Patient had right laser lithotripsy performed on his right ureteral stone 4 days ago and had a stent placed.  Patient removed the stent today and approx 3 hours after removing the stent, he developed abdominal pain.  Patient states the pain is located in his mid abdomen and is sharp in nature.  Pain has been constant and patient has had associated nausea and vomiting.  Patient states he has urinated since removal of the stent.  He denies any fever.  He also denies any chest pain, shortness of breath, diarrhea, neurological changes.    History provided by:  Patient   used: No        Review of Systems   Constitutional: Negative.    HENT: Negative.    Eyes: Negative.    Respiratory: Negative.    Cardiovascular: Negative.    Gastrointestinal: Positive for abdominal pain, nausea and vomiting.   Endocrine: Negative.    Genitourinary: Negative.    Musculoskeletal: Negative.    Skin: Negative.    Allergic/Immunologic: Negative.    Neurological: Negative.    Hematological: Negative.    Psychiatric/Behavioral: Negative.    All other systems reviewed and are negative.      Past Medical History:   Diagnosis Date   • ADHD    • Bipolar 1 disorder    • Depression    • Hypertension    • Kidney stone        Allergies   Allergen Reactions   • Penicillins Anaphylaxis       Past  Quality 110: Preventive Care And Screening: Influenza Immunization: Influenza immunization was not ordered or administered, reason not given Surgical History:   Procedure Laterality Date   • FL CYSTO URETHROSCOPY W REM FB     • MANDIBLE FRACTURE SURGERY     • URETEROSCOPY LASER LITHOTRIPSY WITH STENT INSERTION Right 11/20/2017    Procedure: CYSTO URETEROSCOPY LASER LITHOTRIPSY WITH STENT INSERTION RETROGRADE PYELOGRAM STONE EXTRACTION;  Surgeon: Louis Gonzalez MD;  Location: [facility] OR;  Service:        Family History   Problem Relation Age of Onset   • Hypertension Mother    • Hypertension Father        Social History     Social History   • Marital status:      Spouse name: N/A   • Number of children: N/A   • Years of education: N/A     Social History Main Topics   • Smoking status: Current Some Day Smoker     Packs/day: 0.50     Years: 20.00     Types: Cigarettes   • Smokeless tobacco: Never Used   • Alcohol use No   • Drug use: No   • Sexual activity: Defer     Other Topics Concern   • None     Social History Narrative           Objective   Physical Exam   Constitutional: He is oriented to person, place, and time. He appears well-developed and well-nourished. He appears distressed.   HENT:   Head: Normocephalic and atraumatic.   Eyes: Conjunctivae are normal. Pupils are equal, round, and reactive to light.   Neck: Normal range of motion.   Cardiovascular: Regular rhythm and normal heart sounds.  Tachycardia present.    Pulmonary/Chest: Effort normal and breath sounds normal.   Abdominal: Soft. There is generalized tenderness. There is no rigidity, no rebound, no guarding and no CVA tenderness.   Musculoskeletal: Normal range of motion. He exhibits no edema or deformity.   Neurological: He is alert and oriented to person, place, and time. He has normal strength.   Skin: Skin is warm.   Psychiatric: He has a normal mood and affect. His behavior is normal.   Nursing note and vitals reviewed.      Procedures        ED Course  ED Course    He was given IV fluids, morphine and Zofran.  He did not improve.  Patient was then given Dilaudid.    Lab  Quality 226: Preventive Care And Screening: Tobacco Use: Screening And Cessation Intervention: Patient screened for tobacco use and is an ex/non-smoker Results (last 24 hours)     Procedure Component Value Units Date/Time    CBC & Differential [[id number]] Collected:  11/24/17 1603    Specimen:  Blood Updated:  11/24/17 1616    Narrative:       The following orders were created for panel order CBC & Differential.  Procedure                               Abnormality         Status                     ---------                               -----------         ------                     CBC Auto Differential[[id number]]        Abnormal            Final result                 Please view results for these tests on the individual orders.    Comprehensive Metabolic Panel [[id number]]  (Abnormal) Collected:  11/24/17 1603    Specimen:  Blood Updated:  11/24/17 1620     Glucose 106 (H) mg/dL      BUN 13 mg/dL      Creatinine 1.02 mg/dL      Sodium 143 mmol/L      Potassium 4.0 mmol/L      Chloride 100 mmol/L      CO2 33.0 (H) mmol/L      Calcium 9.2 mg/dL      Total Protein 6.7 g/dL      Albumin 4.00 g/dL      ALT (SGPT) 128 (H) U/L      AST (SGOT) 46 (H) U/L      Alkaline Phosphatase 60 U/L      Total Bilirubin 0.3 mg/dL      eGFR Non African Amer 84 mL/min/1.73      Globulin 2.7 gm/dL      A/G Ratio 1.5 g/dL      BUN/Creatinine Ratio 12.7     Anion Gap 10.0 mmol/L     Lipase [[id number]]  (Normal) Collected:  11/24/17 1603    Specimen:  Blood Updated:  11/24/17 1620     Lipase 39 U/L     CBC Auto Differential [[id number]]  (Abnormal) Collected:  11/24/17 1603    Specimen:  Blood Updated:  11/24/17 1616     WBC 12.71 (H) 10*3/mm3      RBC 4.67 (L) 10*6/mm3      Hemoglobin 13.6 (L) g/dL      Hematocrit 40.6 %      MCV 86.9 fL      MCH 29.1 pg      MCHC 33.5 g/dL      RDW 13.7 %      RDW-SD 43.0 fl      MPV 9.3 fL      Platelets 311 10*3/mm3      Neutrophil % 76.9 %      Lymphocyte % 13.4 (L) %      Monocyte % 7.4 %      Eosinophil % 2.1 %      Basophil % 0.2 %      Neutrophils, Absolute 9.78 (H) 10*3/mm3      Lymphocytes, Absolute 1.70 10*3/mm3      Monocytes, Absolute 0.94  10*3/mm3      Eosinophils, Absolute 0.27 10*3/mm3      Basophils, Absolute 0.02 10*3/mm3     Urinalysis With / Culture If Indicated - Urine, Clean Catch [[id number]]  (Abnormal) Collected:  11/24/17 1606    Specimen:  Urine from Urine, Clean Catch Updated:  11/24/17 1620     Color, UA Orange (A)     Appearance, UA Turbid (A)     pH, UA 7.0     Specific Gravity, UA 1.023     Glucose, UA Negative     Ketones, UA Negative     Bilirubin, UA Negative     Blood, UA Large (3+) (A)     Protein, UA >=300 mg/dL (3+) (A)     Leuk Esterase, UA Small (1+) (A)     Nitrite, UA Negative     Urobilinogen, UA 0.2 E.U./dL    Narrative:       Dipstick results may be inaccurate due to color interference.    Urine Culture - Urine, Urine, Clean Catch [[id number]] Collected:  11/24/17 1606    Specimen:  Urine from Urine, Clean Catch Updated:  11/24/17 1620    Urinalysis, Microscopic Only - Urine, Clean Catch [[id number]]  (Abnormal) Collected:  11/24/17 1606    Specimen:  Urine from Urine, Clean Catch Updated:  11/24/17 1627     RBC, UA Too Numerous to Count (A) /HPF      WBC, UA 13-20 (A) /HPF      Bacteria, UA None Seen /HPF      Squamous Epithelial Cells, UA None Seen /HPF      Hyaline Casts, UA 0-2 /LPF      Methodology Automated Microscopy        US Renal Bilateral   Final Result   Mild dilatation of the right renal collecting system.   Otherwise, normal appearance of the kidneys. Normal color flow is   demonstrated to both testicles.   This report was finalized on 11/24/2017 16:57 by Dr. Kalin Benz MD.        Labs showed leukocytosis, hematuria and elevated LFTs.  Ultrasound showed dilatation of the right renal collecting system.  Pain was not being controlled with medications.  Patient was then admitted by Dr. Wise to the urology service for further treatment.              MDM  Number of Diagnoses or Management Options      Final diagnoses:   Flank pain            Alina Durham MD  11/24/17 3059       Electronically signed by  Alina Durham MD at 11/24/2017  5:12 PM        Vital Signs (last 72 hrs)       11/23 0700  -  11/24 0659 11/24 0700  -  11/25 0659 11/25 0700  -  11/26 0659 11/26 0700  -  11/26 2055   Most Recent    Temp (°F)   97.6 -  98.3    97.9 -  99.1      97.7     97.7 (36.5)    Heart Rate   72 -  110    79 -  106      91     91    Resp   15 -  22    18 -  20      18     18    BP   130/88 -  (!) 178/106    136/92 -  150/100      (!) 136/102     (!) 136/102    SpO2 (%)   97 -  100    95 -  98      99     99          Intake & Output (last 3 days)       11/24 0701 - 11/25 0700 11/25 0701 - 11/26 0700 11/26 0701 - 11/27 0700    I.V. (mL/kg) 908 (10.7) 922 (10.9)     Total Intake(mL/kg) 908 (10.7) 922 (10.9)     Urine (mL/kg/hr) 1875 3800 (1.9) 1800 (1.5)    Total Output 1875 3800 1800    Net -967 -2878 -1800           Unmeasured Urine Occurrence 1 x          Lines, Drains & Airways    Active LDAs     None         Inactive LDAs     Name:   Placement date:   Placement time:   Removal date:   Removal time:   Site:   Days:    [REMOVED] Peripheral IV Line - Single Lumen 11/24/17 2056 median cubital vein (antecubital fossa), right 18 gauge  11/24/17 2056 11/26/17    1218      1                Hospital Medications (all)       Dose Frequency Start End    HYDROmorphone (DILAUDID) injection 1 mg 1 mg Once 11/24/2017 11/24/2017    Sig - Route: Infuse 1 mL into a venous catheter 1 (One) Time. - Intravenous    ketorolac (TORADOL) injection 30 mg 30 mg Once 11/24/2017 11/24/2017    Sig - Route: Infuse 30 mg into a venous catheter 1 (One) Time. - Intravenous    labetalol (NORMODYNE,TRANDATE) injection 20 mg 20 mg Once 11/24/2017 11/24/2017    Sig - Route: Infuse 4 mL into a venous catheter 1 (One) Time. - Intravenous    Morphine sulfate (PF) injection 4 mg 4 mg Once 11/24/2017 11/24/2017    Sig - Route: Infuse 1 mL into a venous catheter 1 (One) Time. - Intravenous    ondansetron (ZOFRAN) injection 4 mg 4 mg Once 11/24/2017 11/24/2017     Sig - Route: Infuse 2 mL into a venous catheter 1 (One) Time. - Intravenous    ondansetron (ZOFRAN) injection 4 mg 4 mg Every 8 Hours PRN 11/24/2017 11/25/2017    Sig - Route: Infuse 2 mL into a venous catheter Every 8 (Eight) Hours As Needed for Nausea or Vomiting. - Intravenous    polyethylene glycol (MIRALAX) packet 17 g 17 g Once 11/24/2017 11/24/2017    Sig - Route: Take 17 g by mouth 1 (One) Time. - Oral    polyethylene glycol (MIRALAX) packet 17 g 17 g Once 11/25/2017 11/25/2017    Sig - Route: Take 17 g by mouth 1 (One) Time. - Oral    sodium chloride 0.9 % infusion 1,000 mL 1,000 mL Once 11/24/2017 11/24/2017    Sig - Route: Infuse 1,000 mL into a venous catheter 1 (One) Time. - Intravenous    dextrose 5 % and sodium chloride 0.45 % infusion (Discontinued) 125 mL/hr Continuous 11/24/2017 11/26/2017    Sig - Route: Infuse 125 mL/hr into a venous catheter Continuous. - Intravenous    Reason for Discontinue: Patient Discharge    escitalopram (LEXAPRO) tablet 10 mg (Discontinued) 10 mg Daily 11/25/2017 11/26/2017    Sig - Route: Take 1 tablet by mouth Daily. - Oral    Reason for Discontinue: Patient Discharge    HYDROcodone-acetaminophen (NORCO) 5-325 MG per tablet 1 tablet (Discontinued) 1 tablet Every 4 Hours PRN 11/25/2017 11/26/2017    Sig - Route: Take 1 tablet by mouth Every 4 (Four) Hours As Needed for Moderate Pain  or Severe Pain . - Oral    Reason for Discontinue: Patient Discharge    HYDROmorphone (DILAUDID) injection 0.5 mg (Discontinued) 0.5 mg Every 2 Hours PRN 11/24/2017 11/25/2017    Sig - Route: Infuse 0.5 mL into a venous catheter Every 2 (Two) Hours As Needed for Moderate Pain  or Severe Pain . - Intravenous    HYDROmorphone (DILAUDID) injection 1 mg (Discontinued) 1 mg Every 4 Hours PRN 11/25/2017 11/26/2017    Sig - Route: Infuse 1 mL into a venous catheter Every 4 (Four) Hours As Needed for Moderate Pain  or Severe Pain . - Intravenous    Reason for Discontinue: Patient Discharge     "ketorolac (TORADOL) injection 15 mg (Discontinued) 15 mg Every 6 Hours PRN 11/25/2017 11/26/2017    Sig - Route: Infuse 15 mg into a venous catheter Every 6 (Six) Hours As Needed for Severe Pain . - Intravenous    Reason for Discontinue: Patient Discharge    ketorolac (TORADOL) injection 30 mg (Discontinued) 30 mg Every 6 Hours PRN 11/25/2017 11/25/2017    Sig - Route: Infuse 30 mg into a venous catheter Every 6 (Six) Hours As Needed for Severe Pain . - Intravenous    lamoTRIgine (LaMICtal) tablet 100 mg (Discontinued) 100 mg Daily 11/25/2017 11/26/2017    Sig - Route: Take 1 tablet by mouth Daily. - Oral    Reason for Discontinue: Patient Discharge    lisinopril (PRINIVIL,ZESTRIL) tablet 10 mg (Discontinued) 10 mg Daily 11/25/2017 11/26/2017    Sig - Route: Take 1 tablet by mouth Daily. - Oral    Reason for Discontinue: Patient Discharge    magnesium hydroxide (MILK OF MAGNESIA) suspension 2400 mg/10mL 10 mL (Discontinued) 10 mL Nightly 11/24/2017 11/26/2017    Sig - Route: Take 10 mL by mouth Every Night. - Oral    Reason for Discontinue: Patient Discharge    polyethylene glycol (MIRALAX) packet 17 g (Discontinued) 17 g Daily 11/25/2017 11/26/2017    Sig - Route: Take 17 g by mouth Daily. - Oral    Reason for Discontinue: Patient Discharge    sodium chloride 0.9 % flush 10 mL (Discontinued) 10 mL As Needed 11/24/2017 11/26/2017    Sig - Route: Infuse 10 mL into a venous catheter As Needed for Line Care. - Intravenous    Reason for Discontinue: Patient Discharge    Linked Group 1:  \"And\" Linked Group Details        tamsulosin (FLOMAX) 24 hr capsule 0.4 mg (Discontinued) 0.4 mg Daily 11/25/2017 11/26/2017    Sig - Route: Take 1 capsule by mouth Daily. - Oral    Reason for Discontinue: Patient Discharge          Lab Results (last 72 hours)     Procedure Component Value Units Date/Time    CBC & Differential [[id number]] Collected:  11/24/17 1603    Specimen:  Blood Updated:  11/24/17 1616    Narrative:       The " following orders were created for panel order CBC & Differential.  Procedure                               Abnormality         Status                     ---------                               -----------         ------                     CBC Auto Differential[[id number]]        Abnormal            Final result                 Please view results for these tests on the individual orders.    CBC Auto Differential [[id number]]  (Abnormal) Collected:  11/24/17 1603    Specimen:  Blood Updated:  11/24/17 1616     WBC 12.71 (H) 10*3/mm3      RBC 4.67 (L) 10*6/mm3      Hemoglobin 13.6 (L) g/dL      Hematocrit 40.6 %      MCV 86.9 fL      MCH 29.1 pg      MCHC 33.5 g/dL      RDW 13.7 %      RDW-SD 43.0 fl      MPV 9.3 fL      Platelets 311 10*3/mm3      Neutrophil % 76.9 %      Lymphocyte % 13.4 (L) %      Monocyte % 7.4 %      Eosinophil % 2.1 %      Basophil % 0.2 %      Neutrophils, Absolute 9.78 (H) 10*3/mm3      Lymphocytes, Absolute 1.70 10*3/mm3      Monocytes, Absolute 0.94 10*3/mm3      Eosinophils, Absolute 0.27 10*3/mm3      Basophils, Absolute 0.02 10*3/mm3     Comprehensive Metabolic Panel [[id number]]  (Abnormal) Collected:  11/24/17 1603    Specimen:  Blood Updated:  11/24/17 1620     Glucose 106 (H) mg/dL      BUN 13 mg/dL      Creatinine 1.02 mg/dL      Sodium 143 mmol/L      Potassium 4.0 mmol/L      Chloride 100 mmol/L      CO2 33.0 (H) mmol/L      Calcium 9.2 mg/dL      Total Protein 6.7 g/dL      Albumin 4.00 g/dL      ALT (SGPT) 128 (H) U/L      AST (SGOT) 46 (H) U/L      Alkaline Phosphatase 60 U/L      Total Bilirubin 0.3 mg/dL      eGFR Non African Amer 84 mL/min/1.73      Globulin 2.7 gm/dL      A/G Ratio 1.5 g/dL      BUN/Creatinine Ratio 12.7     Anion Gap 10.0 mmol/L     Lipase [[id number]]  (Normal) Collected:  11/24/17 1603    Specimen:  Blood Updated:  11/24/17 1620     Lipase 39 U/L     Urinalysis With / Culture If Indicated - Urine, Clean Catch [[id number]]  (Abnormal) Collected:  11/24/17  1606    Specimen:  Urine from Urine, Clean Catch Updated:  11/24/17 1620     Color, UA Orange (A)     Appearance, UA Turbid (A)     pH, UA 7.0     Specific Gravity, UA 1.023     Glucose, UA Negative     Ketones, UA Negative     Bilirubin, UA Negative     Blood, UA Large (3+) (A)     Protein, UA >=300 mg/dL (3+) (A)     Leuk Esterase, UA Small (1+) (A)     Nitrite, UA Negative     Urobilinogen, UA 0.2 E.U./dL    Narrative:       Dipstick results may be inaccurate due to color interference.    Urinalysis, Microscopic Only - Urine, Clean Catch [[id number]]  (Abnormal) Collected:  11/24/17 1606    Specimen:  Urine from Urine, Clean Catch Updated:  11/24/17 1627     RBC, UA Too Numerous to Count (A) /HPF      WBC, UA 13-20 (A) /HPF      Bacteria, UA None Seen /HPF      Squamous Epithelial Cells, UA None Seen /HPF      Hyaline Casts, UA 0-2 /LPF      Methodology Automated Microscopy    CBC & Differential [[id number]] Collected:  11/25/17 0524    Specimen:  Blood Updated:  11/25/17 0553    Narrative:       The following orders were created for panel order CBC & Differential.  Procedure                               Abnormality         Status                     ---------                               -----------         ------                     CBC Auto Differential[[id number]]        Abnormal            Final result                 Please view results for these tests on the individual orders.    CBC Auto Differential [[id number]]  (Abnormal) Collected:  11/25/17 0524    Specimen:  Blood Updated:  11/25/17 0553     WBC 9.74 10*3/mm3      RBC 4.44 (L) 10*6/mm3      Hemoglobin 12.8 (L) g/dL      Hematocrit 39.8 (L) %      MCV 89.6 fL      MCH 28.8 pg      MCHC 32.2 (L) g/dL      RDW 14.1 %      RDW-SD 46.1 fl      MPV 9.8 fL      Platelets 275 10*3/mm3      Neutrophil % 62.4 %      Lymphocyte % 25.6 %      Monocyte % 7.6 %      Eosinophil % 3.8 %      Basophil % 0.3 %      Immature Grans % 0.3 %      Neutrophils, Absolute 6.08  10*3/mm3      Lymphocytes, Absolute 2.49 10*3/mm3      Monocytes, Absolute 0.74 10*3/mm3      Eosinophils, Absolute 0.37 10*3/mm3      Basophils, Absolute 0.03 10*3/mm3      Immature Grans, Absolute 0.03 10*3/mm3     Comprehensive Metabolic Panel [[id number]]  (Abnormal) Collected:  11/25/17 0524    Specimen:  Blood Updated:  11/25/17 0555     Glucose 127 (H) mg/dL      BUN 10 mg/dL      Creatinine 0.88 mg/dL      Sodium 142 mmol/L      Potassium 4.1 mmol/L      Chloride 105 mmol/L      CO2 29.0 mmol/L      Calcium 8.9 mg/dL      Total Protein 6.1 (L) g/dL      Albumin 3.60 g/dL      ALT (SGPT) 117 (H) U/L      AST (SGOT) 57 (H) U/L      Alkaline Phosphatase 54 U/L      Total Bilirubin 0.1 mg/dL      eGFR Non African Amer 99 mL/min/1.73      Globulin 2.5 gm/dL      A/G Ratio 1.4 g/dL      BUN/Creatinine Ratio 11.4     Anion Gap 8.0 mmol/L     Magnesium [[id number]]  (Normal) Collected:  11/25/17 0524    Specimen:  Blood Updated:  11/25/17 0555     Magnesium 2.0 mg/dL     Lactic Acid, Plasma [[id number]]  (Abnormal) Collected:  11/25/17 0524    Specimen:  Blood Updated:  11/25/17 0606     Lactate 2.5 (C) mmol/L     Hepatitis Panel, Acute [[id number]]  (Normal) Collected:  11/25/17 0524    Specimen:  Blood Updated:  11/25/17 0643     HCV S/C Ratio 0.03     Hepatitis C Ab Negative     Hep A IgM Negative     Hep B C IgM Negative     Hepatitis B Surface Ag Negative    Procalcitonin [[id number]]  (Normal) Collected:  11/25/17 0524    Specimen:  Blood Updated:  11/25/17 0705     Procalcitonin <0.25 ng/mL     Narrative:       SIRS, sepsis, severe sepsis, and septic shock are categorized according to the criteria of the consensus conference of the American College of Chest Physicians/Society of Critical Care Medicine.    PCT < 0.5 ng/mL     Systemic infection (sepsis) is not likely.    PCT >0.5 and < 2.0 ng/mL Systemic infection (sepsis) is possible, but other conditions are known to elevate PCT as well.    PCT > 2.0  ng/mL     Systemic infection (sepsis) is likely, unless other causes are known.      PCT > 10.0 ng/mL    Important systemic inflammatory response, almost exclusively due to severe bacterial sepsis or septic shock.    PCT values of < 0.5 ng/mL do not exclude an infection, because localized infections (without systemic signs) may be associated with such low concentrations, or a systemic infection in its initial stages (<6 hours).  Increased PCT can occur without infection.  PCT concentrations between 0.5 and 2.0 ng/mL should be interpreted taking into account the patients history.  It is recommended to retest PCT within 6-24 hours if any concentrations < 2.0 ng/mL are obtained.    Lactic Acid, Plasma [[id number]]  (Normal) Collected:  11/25/17 0825    Specimen:  Blood Updated:  11/25/17 0847     Lactate 1.6 mmol/L     Lactic Acid, Reflex Timer [[id number]] Collected:  11/25/17 0524    Specimen:  Blood Updated:  11/25/17 0916     Extra Tube Hold for add-ons.      Auto resulted.       AFP Tumor Marker [[id number]] Collected:  11/25/17 0524    Specimen:  Blood Updated:  11/25/17 1603    CBC & Differential [[id number]] Collected:  11/26/17 0516    Specimen:  Blood Updated:  11/26/17 0600    Narrative:       The following orders were created for panel order CBC & Differential.  Procedure                               Abnormality         Status                     ---------                               -----------         ------                     CBC Auto Differential[[id number]]        Abnormal            Final result                 Please view results for these tests on the individual orders.    CBC Auto Differential [[id number]]  (Abnormal) Collected:  11/26/17 0516    Specimen:  Blood Updated:  11/26/17 0600     WBC 11.40 (H) 10*3/mm3      RBC 4.55 (L) 10*6/mm3      Hemoglobin 13.1 (L) g/dL      Hematocrit 40.9 %      MCV 89.9 fL      MCH 28.8 pg      MCHC 32.0 (L) g/dL      RDW 14.0 %      RDW-SD 46.0 fl      MPV 9.7 fL       Platelets 283 10*3/mm3      Neutrophil % 62.6 %      Lymphocyte % 23.3 %      Monocyte % 9.5 %      Eosinophil % 4.0 %      Basophil % 0.4 %      Immature Grans % 0.2 %      Neutrophils, Absolute 7.14 10*3/mm3      Lymphocytes, Absolute 2.66 10*3/mm3      Monocytes, Absolute 1.08 10*3/mm3      Eosinophils, Absolute 0.46 10*3/mm3      Basophils, Absolute 0.04 10*3/mm3      Immature Grans, Absolute 0.02 10*3/mm3     Basic Metabolic Panel [[id number]]  (Abnormal) Collected:  11/26/17 0516    Specimen:  Blood Updated:  11/26/17 0611     Glucose 114 (H) mg/dL      BUN 10 mg/dL      Creatinine 0.95 mg/dL      Sodium 142 mmol/L      Potassium 4.1 mmol/L      Chloride 103 mmol/L      CO2 31.0 mmol/L      Calcium 9.0 mg/dL      eGFR Non African Amer 91 mL/min/1.73      BUN/Creatinine Ratio 10.5     Anion Gap 8.0 mmol/L     Narrative:       GFR Normal >60  Chronic Kidney Disease <60  Kidney Failure <15    Urine Culture - Urine, Urine, Clean Catch [[id number]]  (Normal) Collected:  11/24/17 1606    Specimen:  Urine from Urine, Clean Catch Updated:  11/26/17 0618     Urine Culture No growth at 2 days          Imaging Results (last 72 hours)     Procedure Component Value Units Date/Time    US Renal Bilateral [[id number]] Collected:  11/24/17 1654     Updated:  11/24/17 1700    Narrative:       EXAMINATION: US RENAL BILATERAL- 11/24/2017 4:54 PM CST     HISTORY: Abdominal pain status post ureteral stent removal     COMPARISON: CT abdomen and pelvis without contrast 11/15/2017     FINDINGS:  Transabdominal sonographic evaluation of the kidneys was performed in  the transverse and longitudinal projections. The right kidney appears  normal in size and echogenicity, measuring 13.0 cm in length. There is  mild dilatation of the right renal collecting system at the level of the  UPJ. No solid renal mass is demonstrated.     Left kidney appears normal in size and echogenicity, measuring 13.1 cm  in length. There is no sign of  collecting system dilatation or solid  renal mass.     The aorta appears normal in caliber.     The urinary bladder is completely decompressed and therefore not  evaluated.     Due to patient's complaint of pain extending into the right testicle,  color flow evaluation of the testicles was performed, demonstrating  normal color flow bilaterally.       Impression:       Mild dilatation of the right renal collecting system.  Otherwise, normal appearance of the kidneys. Normal color flow is  demonstrated to both testicles.  This report was finalized on 11/24/2017 16:57 by Dr. Kalin Benz MD.    CT Abdomen Pelvis Stone Protocol [[id number]] Collected:  11/25/17 0913     Updated:  11/25/17 0924    Narrative:       EXAM: CT Abdomen and Pelvis without contrast      11/25/2017 9:13 AM CST  INDICATION: right hydronephrosis s/p ureteroscopy; R10.9-Unspecified  abdominal pain  COMPARISON: CT abdomen pelvis dated 11/15/2017.  TECHNIQUE:  Abdomen and pelvis were scanned utilizing a multidetector helical  scanner from the diaphragm to the ischial tuberosities without  administration of IV contrast. Coronal and sagittal reformations were  obtained. [Routine protocol is performed.]     Radiation dose equals DLP 4/17. mGy-cm.  Automated exposure control dose  reduction technique was implemented.        FINDINGS:     LINES and TUBES: None.     LOWER THORAX: 3 mm noncalcified nodule in the right middle lobe. No  focal consolidation.     HEPATOBILIARY:  No focal hepatic lesions.   No liver laceration.   No  biliary ductal dilatation.      GALLBLADDER:  No radiopaque stones or sludge.   No wall thickening.      SPLEEN:  No splenomegaly.    No splenic laceration.      PANCREAS:  No focal masses or ductal dilatation.      ADRENALS:  No adrenal nodules.      KIDNEYS/URETERS: Moderate hydronephrosis and hydroureter redemonstrated,  without significant interval changes. However, the previously seen 5 m  stone at the distal right ureter is  not visualized on this examination.  There is now a 2 mm stone in the mid to distal right ureter (image 113,  series 2). Nonobstructive punctate stone in the inferior pole of the  right kidney is redemonstrated. Perinephric and periureteral fat  stranding. The left kidney is without stones or evidence of  hydronephrosis. No discrete mass identified.      GI TRACT:  No abnormal distention, wall thickening, or evidence of  bowel obstruction.   No evidence of acute appendicitis..      PELVIC ORGANS/BLADDER:  No acute pathology. Calcific density in the  prostate redemonstrated, which may reflect dystrophic calcification  versus a prostatic ureteral stone..      LYMPH NODES:  No lymphadenopathy.      VESSELS:  No acute pathology..      PERITONEUM / RETROPERITONEUM:  No free air or fluid.      BONES:  No suspicious lytic or blastic lesion..      SOFT TISSUES:  Unremarkable.        Impression:          1. Moderate right hydronephrosis and hydroureter. Associated  infiltrative changes may reflect infection or posttreatment changes.  2. 2 mm in the mid to distal right ureter.  3. Previously seen distal right ureter obstructive stone appears to have  progressed.  This report was finalized on 11/25/2017 09:21 by Dr. Flori Cordoba MD.    US Scrotum & Testicles [[id number]] Collected:  11/25/17 1025     Updated:  11/25/17 1033    Narrative:       EXAM: TESTICULAR/SCROTUM ULTRASOUND      DATE:  11/25/2017      COMPARISON: None.      INDICATION:    32 years-year-old Male with right testicular pain.     PROCEDURE: Two-dimensional grayscale , spectral Doppler, and color  Doppler ultrasound examination of the testes was performed.      FINDINGS:   Right testicle: The right testicle has normal contour and echogenicity  and is without mass.There is flow. The right testicle measures 5.2 x 2.5  x 3.3 cm. There is a small epididymal head cyst measuring 0.4 cm.     Left testicle: The left testicle has normal contour and echogenicity  and  is without mass.There is flow. The left testicle measures 4.9 x 2.3 x  3.3 cm. Small left epididymal head cyst measuring 0.3 cm     Testicular flow is symmetric bilaterally.       Impression:       Unremarkable testicular ultrasound.  This report was finalized on 11/25/2017 10:30 by Dr. Flori Cordoba MD.    US Abdomen Limited [[id number]] Collected:  11/25/17 1030     Updated:  11/25/17 1036    Narrative:       EXAMINATION: US ABDOMEN LIMITED-.     HISTORY: Transaminitis; R10.9-Unspecified abdominal pain     COMPARISON: CT abdomen pelvis dated 11/24/2017.     PROCEDURE: Real-time sonographic evaluation of the right upper quadrant  was performed. Multiple representative images were obtained and archived  to PACS for review.     FINDINGS:     PANCREAS: The visualized pancreas is normal. The distal body and tail  are obscured due to overlying bowel gas.      LIVER: Diffusely hyperechoic appearance of the liver, consistent with  hepatosteatosis. Additionally, the left lobe of the liver shows a 2 x  1.5 cm hypoechoic focus, which may reflect occult fatty sparing versus a  discrete lesion..      GALLBLADDER AND BILE DUCTS: The gallbladder is not distended. There are  no gallstones. The gallbladder wall measures 2 mm in thickness. Multiple  other polyps are present. The one measures 4 mm. There is no  pericholecystic fluid. There is no intra-or extrahepatic biliary ductal  dilatation. The common bile duct is non-dilated and measures 3  millimeters .     RIGHT KIDNEY: Mild to moderate hydronephrosis on the right..       Impression:       1. Diffuse hepatosteatosis.  2. 2 cm hypoechoic lesion in the left lobe of the liver. Further  characterization with MRI of the abdomen with and without contrast on an  outpatient basis recommended.  3. Gallbladder polyps.  4. Mild to moderate right hydronephrosis.           This report was finalized on 11/25/2017 10:33 by Dr. Flori Cordoba MD.          Orders (all)     Start      Ordered    11/26/17 1147  Discharge patient  Once      11/26/17 1147    11/26/17 1147  Discontinue IV  Once,   Status:  Canceled      11/26/17 1147    11/26/17 0832  Diet Regular  Diet Effective Now,   Status:  Canceled      11/26/17 0831    11/26/17 0600  CBC & Differential  Morning Draw      11/25/17 1432    11/26/17 0600  Basic Metabolic Panel  Morning Draw      11/25/17 1432    11/26/17 0600  CBC Auto Differential  PROCEDURE ONCE      11/26/17 0001    11/26/17 0001  NPO Diet  Diet Effective Midnight,   Status:  Canceled      11/25/17 0953    11/26/17 0000  HYDROcodone-acetaminophen (NORCO) 5-325 MG per tablet  Every 6 Hours PRN      11/26/17 1147    11/26/17 0000  Discharge Follow-up with Specified Provider:      11/26/17 1147    11/26/17 0000  Call MD With Problems / Concerns     Comments:  Instructions: For temperature greater 101, inability to urinate, persistent nausea with vomiting.  You should expect blood-tinged urine for up to 2 weeks intermittently.  Contact us if having difficulty urinating due to the size of blood clots.    11/26/17 1147    11/26/17 0000  Discharge Activity Restrictions     Comments:  1) No driving for 1 day and no longer taking narcotics.   2) Return to school / work in 3 days.  3) May shower / sponge bathe today  4) Do not lift / push / pull more then 10 lbs for 48 hours.    11/26/17 1147    11/25/17 1515  lamoTRIgine (LaMICtal) tablet 100 mg  Daily,   Status:  Discontinued      11/25/17 1431    11/25/17 1515  polyethylene glycol (MIRALAX) packet 17 g  Daily,   Status:  Discontinued      11/25/17 1431    11/25/17 1501  AFP Tumor Marker  Once      11/25/17 1500    11/25/17 1445  escitalopram (LEXAPRO) tablet 10 mg  Daily,   Status:  Discontinued      11/25/17 1430    11/25/17 1445  lisinopril (PRINIVIL,ZESTRIL) tablet 10 mg  Daily,   Status:  Discontinued      11/25/17 1430    11/25/17 1429  HYDROcodone-acetaminophen (NORCO) 5-325 MG per tablet 1 tablet  Every 4 Hours PRN,   Status:   Discontinued      11/25/17 1430    11/25/17 1120  Diet Regular  Diet Effective Now,   Status:  Canceled      11/25/17 1119    11/25/17 1030  tamsulosin (FLOMAX) 24 hr capsule 0.4 mg  Daily,   Status:  Discontinued      11/25/17 0953    11/25/17 1017  Lactic Acid, Reflex  STAT,   Status:  Canceled      11/25/17 1016    11/25/17 0952  Diet Clear Liquid  Diet Effective Now,   Status:  Canceled      11/25/17 0953    11/25/17 0952  Advance Diet As Tolerated  Until Discontinued,   Status:  Canceled      11/25/17 0953    11/25/17 0905  US Abdomen Limited  1 Time Imaging      11/24/17 2248    11/25/17 0808  Lactic Acid, Plasma  STAT      11/25/17 0807    11/25/17 0800  HYDROmorphone (DILAUDID) injection 1 mg  Every 4 Hours PRN,   Status:  Discontinued      11/25/17 0748    11/25/17 0747  ketorolac (TORADOL) injection 15 mg  Every 6 Hours PRN,   Status:  Discontinued      11/25/17 0748    11/25/17 0747  ketorolac (TORADOL) injection 30 mg  Every 6 Hours PRN,   Status:  Discontinued      11/25/17 0748    11/25/17 0741  US Scrotum & Testicles  1 Time Imaging      11/25/17 0741    11/25/17 0739  CT Abdomen Pelvis Stone Protocol  1 Time Imaging      11/25/17 0741    11/25/17 0729  Full Code  Continuous,   Status:  Canceled      11/25/17 0729    11/25/17 0729  VTE Risk Assessment - Low Risk  Once      11/25/17 0729    11/25/17 0729  Pharmacologic VTE Prophylaxis Not Indicated: Low Risk for VTE  Once      11/25/17 0729    11/25/17 0729  Mechanical VTE Prophylaxis Not Indicated: Low Risk for VTE  Once      11/25/17 0729    11/25/17 0607  Lactic Acid, Reflex Timer  Once      11/25/17 0606    11/25/17 0600  polyethylene glycol (MIRALAX) packet 17 g  Once      11/24/17 1930    11/25/17 0600  Comprehensive Metabolic Panel  Morning Draw,   Status:  Canceled      11/24/17 1930 11/25/17 0600  CBC & Differential  Morning Draw      11/24/17 2248    11/25/17 0600  Comprehensive Metabolic Panel  Morning Draw      11/24/17 2248 11/25/17  0600  Magnesium  Morning Draw      11/24/17 2248    11/25/17 0600  Lactic Acid, Plasma  Morning Draw      11/24/17 2248    11/25/17 0600  Procalcitonin  Morning Draw      11/24/17 2248    11/25/17 0600  US Abdomen Complete  1 Time Imaging,   Status:  Canceled      11/24/17 2248    11/25/17 0600  Hepatitis Panel, Acute  Morning Draw      11/24/17 2254    11/25/17 0600  CBC Auto Differential  PROCEDURE ONCE      11/25/17 0001    11/25/17 0500  Hepatitis Panel, Acute  Morning Draw,   Status:  Canceled      11/24/17 2248 11/25/17 0400  CBC (No Diff)  Once,   Status:  Canceled      11/24/17 1930 11/25/17 0001  NPO Diet  Diet Effective Midnight,   Status:  Canceled      11/24/17 1930 11/24/17 2248  Place Sequential Compression Device  Once      11/24/17 2248 11/24/17 2100  magnesium hydroxide (MILK OF MAGNESIA) suspension 2400 mg/10mL 10 mL  Nightly,   Status:  Discontinued      11/24/17 1930 11/24/17 2015  dextrose 5 % and sodium chloride 0.45 % infusion  Continuous,   Status:  Discontinued      11/24/17 1930 11/24/17 2015  polyethylene glycol (MIRALAX) packet 17 g  Once      11/24/17 1930 11/24/17 1945  labetalol (NORMODYNE,TRANDATE) injection 20 mg  Once      11/24/17 1908    11/24/17 1903  Inpatient Consult to Hospitalist  Once,   Status:  Canceled     Provider:  Wallace Garcia MD    11/24/17 1903    11/24/17 1713  ketorolac (TORADOL) injection 30 mg  Once      11/24/17 1711    11/24/17 1711  Diet Regular  Diet Effective Now,   Status:  Canceled      11/24/17 1711    11/24/17 1710  ondansetron (ZOFRAN) injection 4 mg  Every 8 Hours PRN      11/24/17 1711 11/24/17 1710  HYDROmorphone (DILAUDID) injection 0.5 mg  Every 2 Hours PRN,   Status:  Discontinued      11/24/17 1711    11/24/17 1710  Inpatient Admission  Once      11/24/17 1711    11/24/17 1706  HYDROmorphone (DILAUDID) injection 1 mg  Once      11/24/17 1704    11/24/17 1621  Urine Culture - Urine, Urine, Clean Catch  Once       11/24/17 1620    11/24/17 1621  Urinalysis, Microscopic Only - Urine, Clean Catch  Once      11/24/17 1620    11/24/17 1616  US Renal Bilateral  1 Time Imaging      11/24/17 1500    11/24/17 1502  sodium chloride 0.9 % infusion 1,000 mL  Once      11/24/17 1500    11/24/17 1502  Morphine sulfate (PF) injection 4 mg  Once      11/24/17 1500    11/24/17 1502  ondansetron (ZOFRAN) injection 4 mg  Once      11/24/17 1500    11/24/17 1501  CBC & Differential  Once      11/24/17 1500    11/24/17 1501  Comprehensive Metabolic Panel  Once      11/24/17 1500    11/24/17 1501  Lipase  Once      11/24/17 1500    11/24/17 1501  Urinalysis With / Culture If Indicated - Urine, Clean Catch  Once      11/24/17 1500    11/24/17 1501  Insert peripheral IV  Once      11/24/17 1500    11/24/17 1501  US Renal Limited  1 Time Imaging,   Status:  Canceled      11/24/17 1500    11/24/17 1501  CBC Auto Differential  PROCEDURE ONCE      11/24/17 1500    11/24/17 1500  sodium chloride 0.9 % flush 10 mL  As Needed,   Status:  Discontinued      11/24/17 1500          Operative/Procedure Notes (all)     No notes of this type exist for this encounter.           Physician Progress Notes (last 72 hours) (Notes from 11/23/2017  8:55 PM through 11/26/2017  8:55 PM)      Zach Wise MD at 11/25/2017  7:49 AM  Version 2 of 2             Chief complaint: Right testicular pain    HPI:    Hx: Severe onset of right testicular pain. No flank pain but some discomfort in the right inguinal area. No hematuria. This was not relieved by 0.5 mg hydromorphone. He denies nausea.     ROS: Constitutional: Negative for chills and fever.   Gastrointestinal: Negative for abdominal pain, anal bleeding and blood in stool.   Genitourinary: Negative for flank pain and hematuria.      Medication Review:     Current Facility-Administered Medications:   •  dextrose 5 % and sodium chloride 0.45 % infusion, 125 mL/hr, Intravenous, Continuous, Zach Wise MD, Last  Rate: 125 mL/hr at 11/25/17 0419, 125 mL/hr at 11/25/17 0419  •  HYDROmorphone (DILAUDID) injection 1 mg, 1 mg, Intravenous, Q4H PRN, Zach Wise MD  •  ketorolac (TORADOL) injection 15 mg, 15 mg, Intravenous, Q6H PRN, Zach Wise MD  •  ketorolac (TORADOL) injection 30 mg, 30 mg, Intravenous, Q6H PRN, Zach Wise MD  •  magnesium hydroxide (MILK OF MAGNESIA) suspension 2400 mg/10mL 10 mL, 10 mL, Oral, Nightly, Zach Wise MD, 10 mL at 11/24/17 2014  •  ondansetron (ZOFRAN) injection 4 mg, 4 mg, Intravenous, Q8H PRN, Zach Wise MD  •  Insert peripheral IV, , , Once **AND** sodium chloride 0.9 % flush 10 mL, 10 mL, Intravenous, PRN, Alina Durham MD  EXAM:     Temp:  [97.6 °F (36.4 °C)-98.3 °F (36.8 °C)] 98.2 °F (36.8 °C)  Heart Rate:  [] 105  Resp:  [15-22] 20  BP: (130-178)/() 130/88  Very agitated  Completely disrobed with no linen over him.   Seems to be in severe distress.  Alert and oriented x 3.   Not diaphoretic.   No CVA tenderness  Slight right mid abdominal tenderness.   Scrotum with erythema. Left testis is normal    The right testis seems tender but is normal in consistency.  There is a normal wide.  I cannot feel any evidence of torsion of the cord.  There is no evidence of an inguinal hernia.  The patient is writhing.    DATA Review:       Assessment/Plan:     #1. Right testicular discomfort. Exam is inconsistent with torsion. Will get a scrotal ultrasound with doppler. I believe this to be referred pain from ureteral obstruction. He may have a residual stone fragment.   #2. Right hydronephrosis. ? Residual stone fragment vs. Edema after stent removal yesterday.     -Will obtain a CT scan of the abdomen and pelvis using stone protocol to follow-up hydronephrosis and look for residual stone fragment.  -Will obtain a scrotal ultrasound with Doppler to rule out testicular torsion which is inconsistent with my exam.    This patient is very upset with me.  He is  "refusing to have ureteral stent which I did mention last night since he has residual hydronephrosis that may be required if he has continued symptoms from obstruction.  This morning he adamantly refuses to have a stent placed. He stated \"I have heard about specialists like you that just want to do procedures\".  I have explained to him that we at least need to see what the studies would be before I would recommend anything further.  Patient did call his grandmother who I discussed the case with by phone.  This was obviously done with his permission as he calls the grandmother himself being on the phone with her when I entered the room after he requested I returned from the initial visit.    Zach Wise MD  11/25/17  7:49 AM    Addendum: Independent review of a CT scan of abdomen and pelvis without contrast  The CT scan of the abdomen/pelvis done without contrast is available for me to review.  Treatment recommendations require an independent review.  First I scanned the liver, spleen, and bowel pattern.  The retroperitoneum including the major vessels and lymphatic packages are briefly reviewed.  This film as been reviewed by the radiologist to determine any non urologic abnormalities that are present.  The kidneys are closely inspected for size, symmetry, contour, parenchymal thickness, perinephric reaction, presence of calcifications, and intrarenal dilation of the collecting system.  The ureters are inspected for their course, caliber, and any calcifications.  The bladder is inspected for its thickness, size, and presence of any calcifications.  This scan shows a 2 mm fragment just below the level of the vessels. There is moderate hydroureteronephrosis extending well below this most consistent with obstruction below the stone from the procedure which is not unusual.     Plan: Await scrotal US results due to patient reported severity but he has experienced testicular pain throughout this stone episode. Since he " refuses intervention with stent with or without ureteroscopic stone extraction, will try him on an an alpha blocker and use parenteral analgesics with Toradol and hydromorphone.            Electronically signed by Zach Wise MD at 11/25/2017  9:50 AM      Zach Wise MD at 11/25/2017  7:49 AM  Version 1 of 2             Chief complaint: Right testicular pain    HPI:    Hx: Severe onset of right testicular pain. No flank pain but some discomfort in the right inguinal area. No hematuria. This was not relieved by 0.5 mg hydromorphone. He denies nausea.     ROS: Constitutional: Negative for chills and fever.   Gastrointestinal: Negative for abdominal pain, anal bleeding and blood in stool.   Genitourinary: Negative for flank pain and hematuria.      Medication Review:     Current Facility-Administered Medications:   •  dextrose 5 % and sodium chloride 0.45 % infusion, 125 mL/hr, Intravenous, Continuous, Zach Wise MD, Last Rate: 125 mL/hr at 11/25/17 0419, 125 mL/hr at 11/25/17 0419  •  HYDROmorphone (DILAUDID) injection 1 mg, 1 mg, Intravenous, Q4H PRN, Zach Wise MD  •  ketorolac (TORADOL) injection 15 mg, 15 mg, Intravenous, Q6H PRN, Zach Wise MD  •  ketorolac (TORADOL) injection 30 mg, 30 mg, Intravenous, Q6H PRN, Zach Wise MD  •  magnesium hydroxide (MILK OF MAGNESIA) suspension 2400 mg/10mL 10 mL, 10 mL, Oral, Nightly, Zach Wise MD, 10 mL at 11/24/17 2014  •  ondansetron (ZOFRAN) injection 4 mg, 4 mg, Intravenous, Q8H PRN, Zach Wise MD  •  Insert peripheral IV, , , Once **AND** sodium chloride 0.9 % flush 10 mL, 10 mL, Intravenous, PRN, Alina Durham MD  EXAM:     Temp:  [97.6 °F (36.4 °C)-98.3 °F (36.8 °C)] 98.2 °F (36.8 °C)  Heart Rate:  [] 105  Resp:  [15-22] 20  BP: (130-178)/() 130/88  Very agitated  Completely disrobed with no linen over him.   Seems to be in severe distress.  Alert and oriented x 3.   Not diaphoretic.   No CVA  "tenderness  Slight right mid abdominal tenderness.   Scrotum with erythema. Left testis is normal    The right testis seems tender but is normal in consistency.  There is a normal wide.  I cannot feel any evidence of torsion of the cord.  There is no evidence of an inguinal hernia.  The patient is writhing.      Assessment/Plan:     #1. Right testicular discomfort. Exam is inconsistent with torsion. Will get a scrotal ultrasound with doppler. I believe this to be referred pain from ureteral obstruction. He may have a residual stone fragment.   #2. Right hydronephrosis. ? Residual stone fragment vs. Edema after stent removal yesterday.     -Will obtain a CT scan of the abdomen and pelvis using stone protocol to follow-up hydronephrosis and look for residual stone fragment.  -Will obtain a scrotal ultrasound with Doppler to rule out testicular torsion which is inconsistent with my exam.    This patient is very upset with me.  He is refusing to have ureteral stent which I did mention last night since he has residual hydronephrosis that may be required if he has continued symptoms from obstruction.  This morning he adamantly refuses to have a stent placed. He stated \"I have heard about specialists like you that just want to do procedures\".  I have explained to him that we at least need to see what the studies would be before I would recommend anything further.  Patient did call his grandmother who I discussed the case with by phone.  This was obviously done with his permission as he calls the grandmother himself being on the phone with her when I entered the room after he requested I returned from the initial visit.    Zach Wise MD  11/25/17  7:49 AM       Electronically signed by Zach Wise MD at 11/25/2017  8:01 AM      BRENDA Wilson at 11/25/2017  2:20 PM  Version 1 of 1    Attestation signed by Agustin Daniel DO at 11/25/2017  3:45 PM        I personally evaluated and examined the patient in " conjunction with BRENDA Aburto and agree with the assessment, treatment plan, and disposition of the patient as recorded by her. My history, exam, and further recommendations are:     Plan:  1. Home medications reviewed and restarted   2. Labs in am: CBC w/diff and BMP  3. Check AFP    Agustin Daniel, DO  11/25/17  3:44 PM                                       [facility] Medicine Services  INPATIENT PROGRESS NOTE    Length of Stay: 1  Date of Admission: 11/24/2017  Primary Care Physician: Chano Harper MD    Subjective   Chief Complaint: follow up transaminitis and hypertension    HPI   Patient resting quietly, no complaints, denies scrotal pain this afternoon.  States may have a procedure tomorrow. Per documentation, patient refusing intervention. No distress.  Long discussion re: need to follow up liver lesion.  Currently followed intermittently by Dr Harper.      Review of Systems   All pertinent negatives and positives are as above. All other systems have been reviewed and are negative unless otherwise stated.     Objective    Temp:  [97.6 °F (36.4 °C)-98.3 °F (36.8 °C)] 98.2 °F (36.8 °C)  Heart Rate:  [] 79  Resp:  [15-22] 20  BP: (130-178)/() 137/91    Physical Exam   Constitutional: He is oriented to person, place, and time. He appears well-developed and well-nourished. No distress.   HENT:   Head: Normocephalic and atraumatic.   Eyes: Conjunctivae and EOM are normal. Pupils are equal, round, and reactive to light. No scleral icterus.   Neck: Normal range of motion. Neck supple. No JVD present. No tracheal deviation present.   Cardiovascular: Normal rate, regular rhythm, normal heart sounds and intact distal pulses.  Exam reveals no gallop.    No murmur heard.  Pulmonary/Chest: Effort normal and breath sounds normal. No respiratory distress. He has no wheezes. He has no rales.   Abdominal: Soft. Bowel sounds are normal. He exhibits no distension. There is  no tenderness. There is no guarding.   Musculoskeletal: Normal range of motion. He exhibits no edema.   Neurological: He is alert and oriented to person, place, and time.   No obvious deficits noted.   Skin: Skin is warm and dry. No rash noted. He is not diaphoretic. No erythema. No pallor.   Psychiatric: He has a normal mood and affect. His behavior is normal.   Vitals reviewed.        Allergies   Allergen Reactions   • Penicillins Anaphylaxis       Scheduled meds:     magnesium hydroxide 10 mL Oral Nightly   tamsulosin 0.4 mg Oral Daily       PRN meds:  HYDROmorphone  •  ketorolac  •  ondansetron  •  Insert peripheral IV **AND** sodium chloride    Assessment/Plan     Active Problems:    Flank pain  1.  Right ureteral obstruction following stent removal  2.   Transaminitis - diffuse hepatosteatosis, negative hepatitis screen  3.  Questionable sepsis during admission  4.   Mild to moderate right hydronephrosis.  5.  Leukocytosis  - resolved  6.  Hypertension  7.  Abdominal discomfort possibly underlying constipation  8.  2 cm hypoechoic lesion in the left lobe of the liver. MRI of the abdomen with and without contrast on an  outpatient basis recommended.      Plan:  1. Home medications reviewed and restarted   2. Labs in am: CBC w/diff and BMP  3. Check AFP    Discharge Planning:  Per Dr Frandy Pineda, APRN   11/25/17   2:21 PM                   Electronically signed by Agustin Daniel DO at 11/25/2017  3:45 PM           Consult Notes (all)      Wallace Garcia MD at 11/24/2017 10:27 PM  Version 3 of 3               [facility] Medicine Consult  Consults    Date of Admission: 11/24/2017  Date of Consult: 11/24/17    Primary Care Physician: Chano Harper MD  Referring Physician: Dr. Wise  Chief Complaint/Reason for Consultation: Hypertension and transaminitis    Subjective   History of Present Illness    32-year-old male with past medical history of ADHD, bipolar  1 disorder, depression, hypertension and kidney stone status post lithotripsy with stent placement comes into the ER with abdominal pain.  Apparently patient had a recent lithotripsy done with stent and patient removed his stent earlier this afternoon.  Later this afternoon he started experiencing abdominal discomfort.  He was diagnosed with right ureteral obstruction following stent removal and was admitted under urology service.  During admission patient was found to be hypertensive and his initial lab work showed transaminitis.  Hospital medicine was consulted by urologist Dr. Wise for hypertension management as well as hepatic panel abnormalities.  During admission patient was also met SIRS criteria from possible urinary origin.      Objective    Physical Exam:   Temp:  [97.6 °F (36.4 °C)-98.3 °F (36.8 °C)] (P) 98.3 °F (36.8 °C)  Heart Rate:  [] (P) 110  Resp:  [15-22] (P) 22  BP: (144-178)/() (P) 169/99     Physical Exam   Constitutional: He is oriented to person, place, and time. He appears well-developed.   HENT:   Head: Normocephalic.   Eyes: Pupils are equal, round, and reactive to light.   Neck: Normal range of motion.   Cardiovascular: Normal rate, regular rhythm and normal heart sounds.    Pulmonary/Chest: Effort normal and breath sounds normal.   Abdominal: Soft. Bowel sounds are normal.   Musculoskeletal: Normal range of motion.   Neurological: He is alert and oriented to person, place, and time.   Skin: Skin is warm.   Psychiatric: He has a normal mood and affect. His behavior is normal. Judgment and thought content normal.         Hospital Problem List     Flank pain         1.  Right ureteral obstruction following stent removal  2.  Hypertension  3.  Transaminitis  4.  Questionable sepsis during admission  5.  Leukocytosis  6.  Abdominal discomfort possibly underlying constipation    Plan:     -Patient is currently admitted under urology service  -Ureteral obstruction management as per  urology  -Pain management as per urology  -Monitor vitals  -Administer antihypertensive medication as needed overnight  -Continue patient's home hypertensive medication  -Initial hepatic panel noted  -Given patient has history of IV drug abuse and one sexual partner with hepatitis C in the past-will follow up hepatitis panel  -Follow-up abdominal ultrasound  -Hold or avoid hepatotoxic medications now  -Initially during admission patient met SIRS criteria  -Received IV fluid bolus in the ER  -Currently vitals does not indicate any signs of sepsis  -Will continue to monitor for sepsis  -Agree with Dr. Wise as would start broad-spectrum antibiotics if patient shows signs of acute infection or worsening symptoms  -Follow-up blood culture  -Follow-up urine culture  -Follow-up lactic acid  -Follow-up pro-calcitonin  -Follow-up a.m. WBC  -Consider aggressive fluid resuscitation if indicated  -Continue bowel regimen for constipation  -GI prophylaxis  -DVT prophylaxis    I discussed the patients findings and my recommendations with Patient and his RN .    Wallace Garcia MD  11/24/17  10:27 PM           Electronically signed by Wallace Garcia MD at 11/24/2017 11:04 PM      Wallace Garcia MD at 11/24/2017 10:27 PM  Version 2 of 3               [facility] Medicine Consult  Consults    Date of Admission: 11/24/2017  Date of Consult: 11/24/17    Primary Care Physician: Chano Harper MD  Referring Physician: Dr. Wise  Chief Complaint/Reason for Consultation: Hypertension and transaminitis    Subjective   History of Present Illness    32-year-old male with past medical history of ADHD, bipolar 1 disorder, depression, hypertension and kidney stone status post lithotripsy with stent placement comes into the ER with abdominal pain.  Apparently patient had a recent lithotripsy done with stent and patient removed his stent earlier this afternoon.  Later this afternoon he started  experiencing abdominal discomfort.  He was diagnosed with right ureteral obstruction following stent removal and was admitted under urology service.  During admission patient was found to be hypertensive and his initial lab work showed transaminitis.  Hospital medicine was consulted by urologist Dr. Wise for hypertension management as well as hepatic panel abnormalities.  During admission patient was also met sepsis criteria from possible urinary origin.        Allergies:   Allergies   Allergen Reactions   • Penicillins Anaphylaxis     Medications: Scheduled Meds:  magnesium hydroxide 10 mL Oral Nightly   [START ON 11/25/2017] polyethylene glycol 17 g Oral Once     Continuous Infusions:  dextrose 5 % and sodium chloride 0.45 % 125 mL/hr Last Rate: 125 mL/hr (11/24/17 2013)     PRN Meds:.HYDROmorphone  •  ondansetron  •  Insert peripheral IV **AND** sodium chloride    Objective   Objective    Physical Exam:   Temp:  [97.6 °F (36.4 °C)-98.3 °F (36.8 °C)] (P) 98.3 °F (36.8 °C)  Heart Rate:  [] (P) 110  Resp:  [15-22] (P) 22  BP: (144-178)/() (P) 169/99     Physical Exam   Constitutional: He is oriented to person, place, and time. He appears well-developed.   HENT:   Head: Normocephalic.   Eyes: Pupils are equal, round, and reactive to light.   Neck: Normal range of motion.   Cardiovascular: Normal rate, regular rhythm and normal heart sounds.    Pulmonary/Chest: Effort normal and breath sounds normal.   Abdominal: Soft. Bowel sounds are normal.   Musculoskeletal: Normal range of motion.   Neurological: He is alert and oriented to person, place, and time.   Skin: Skin is warm.   Psychiatric: He has a normal mood and affect. His behavior is normal. Judgment and thought content normal.            1.  Right ureteral obstruction following stent removal  2.  Hypertension  3.  Transaminitis  4.  Questionable sepsis during admission  5.  Leukocytosis  6.  Abdominal discomfort possibly underlying  constipation    Plan:     -Patient is currently admitted under urology service  -Ureteral obstruction management as per urology  -Pain management as per urology  -Monitor vitals  -Administer antihypertensive medication as needed overnight  -Continue patient's home hypertensive medication  -Initial hepatic panel noted  -Given patient has history of IV drug abuse and one sexual partner with hepatitis C in the past-will follow up hepatitis panel  -Follow-up abdominal ultrasound  -Hold or avoid hepatotoxic medications now  -Initially during admission patient met sepsis criteria  -Received IV fluid bolus in the ER  -Currently vitals does not indicate any signs of sepsis  -Will continue to monitor for sepsis  -Agree with Dr. Wise as would start broad-spectrum antibiotics if patient shows signs of acute infection or worsening symptoms  -Follow-up blood culture  -Follow-up urine culture  -Follow-up lactic acid  -Follow-up pro-calcitonin  -Follow-up a.m. WBC  -Consider aggressive fluid resuscitation if indicated  -Continue bowel regimen for constipation  -GI prophylaxis  -DVT prophylaxis    I discussed the patients findings and my recommendations with Patient and his RN .    Wallace Garcia MD  11/24/17  10:27 PM           Electronically signed by Wallace Garcia MD at 11/24/2017 11:01 PM      Wallace Garcia MD at 11/24/2017 10:27 PM  Version 1 of 3               [facility] Medicine Consult  Consults    Date of Admission: 11/24/2017  Date of Consult: 11/24/17    Primary Care Physician: Chano Harper MD  Referring Physician: Dr. Wise  Chief Complaint/Reason for Consultation: Hypertension and transaminitis    Subjective   History of Present Illness    32-year-old male with past medical history of ADHD, bipolar 1 disorder, depression, hypertension and kidney stone status post lithotripsy with stent placement comes into the ER with abdominal pain.  Apparently patient had a recent  lithotripsy done with stent and patient removed his own stent earlier this afternoon.  Later this afternoon he started experiencing abdominal discomfort.  He was diagnosed with right ureteral obstruction following stent removal and was admitted under urology service.  During admission patient was found to be hypertensive and his initial lab work showed transaminitis.  Hospital medicine was consulted by urologist Dr. Wise for hypertension management as well as hepatic panel abnormalities.  During admission patient was also met sepsis criteria from possible urinary origin.      Review of Systems   Otherwise complete ROS is negative except as mentioned above.    Past Medical History:   Past Medical History:   Diagnosis Date   • ADHD    • Bipolar 1 disorder    • Depression    • Hypertension    • Kidney stone      Past Surgical History:  Past Surgical History:   Procedure Laterality Date   • FL CYSTO URETHROSCOPY W REM FB     • MANDIBLE FRACTURE SURGERY     • URETEROSCOPY LASER LITHOTRIPSY WITH STENT INSERTION Right 11/20/2017    Procedure: CYSTO URETEROSCOPY LASER LITHOTRIPSY WITH STENT INSERTION RETROGRADE PYELOGRAM STONE EXTRACTION;  Surgeon: Louis Gonzalez MD;  Location: [facility];  Service:      Social History:  reports that he has been smoking Cigarettes.  He has a 10.00 pack-year smoking history. He has never used smokeless tobacco. He reports that he does not drink alcohol or use illicit drugs.    Family History: family history includes Hypertension in his father and mother.     Allergies:   Allergies   Allergen Reactions   • Penicillins Anaphylaxis     Medications: Scheduled Meds:  magnesium hydroxide 10 mL Oral Nightly   [START ON 11/25/2017] polyethylene glycol 17 g Oral Once     Continuous Infusions:  dextrose 5 % and sodium chloride 0.45 % 125 mL/hr Last Rate: 125 mL/hr (11/24/17 2013)     PRN Meds:.HYDROmorphone  •  ondansetron  •  Insert peripheral IV **AND** sodium chloride    Objective   Objective     Physical Exam:   Temp:  [97.6 °F (36.4 °C)-98.3 °F (36.8 °C)] (P) 98.3 °F (36.8 °C)  Heart Rate:  [] (P) 110  Resp:  [15-22] (P) 22  BP: (144-178)/() (P) 169/99     Physical Exam   Constitutional: He is oriented to person, place, and time. He appears well-developed.   HENT:   Head: Normocephalic.   Eyes: Pupils are equal, round, and reactive to light.   Neck: Normal range of motion.   Cardiovascular: Normal rate, regular rhythm and normal heart sounds.    Pulmonary/Chest: Effort normal and breath sounds normal.   Abdominal: Soft. Bowel sounds are normal.   Musculoskeletal: Normal range of motion.   Neurological: He is alert and oriented to person, place, and time.   Skin: Skin is warm.   Psychiatric: He has a normal mood and affect. His behavior is normal. Judgment and thought content normal.           Results Reviewed:  I have personally reviewed current lab, radiology, and data and agree with results.  Lab Results (last 24 hours)     Procedure Component Value Units Date/Time    CBC & Differential [[id number]] Collected:  11/24/17 1603    Specimen:  Blood Updated:  11/24/17 1616    Narrative:       The following orders were created for panel order CBC & Differential.  Procedure                               Abnormality         Status                     ---------                               -----------         ------                     CBC Auto Differential[[id number]]        Abnormal            Final result                 Please view results for these tests on the individual orders.    CBC Auto Differential [[id number]]  (Abnormal) Collected:  11/24/17 1603    Specimen:  Blood Updated:  11/24/17 1616     WBC 12.71 (H) 10*3/mm3      RBC 4.67 (L) 10*6/mm3      Hemoglobin 13.6 (L) g/dL      Hematocrit 40.6 %      MCV 86.9 fL      MCH 29.1 pg      MCHC 33.5 g/dL      RDW 13.7 %      RDW-SD 43.0 fl      MPV 9.3 fL      Platelets 311 10*3/mm3      Neutrophil % 76.9 %      Lymphocyte % 13.4 (L) %       Monocyte % 7.4 %      Eosinophil % 2.1 %      Basophil % 0.2 %      Neutrophils, Absolute 9.78 (H) 10*3/mm3      Lymphocytes, Absolute 1.70 10*3/mm3      Monocytes, Absolute 0.94 10*3/mm3      Eosinophils, Absolute 0.27 10*3/mm3      Basophils, Absolute 0.02 10*3/mm3     Urine Culture - Urine, Urine, Clean Catch [[id number]] Collected:  11/24/17 1606    Specimen:  Urine from Urine, Clean Catch Updated:  11/24/17 1620    Comprehensive Metabolic Panel [[id number]]  (Abnormal) Collected:  11/24/17 1603    Specimen:  Blood Updated:  11/24/17 1620     Glucose 106 (H) mg/dL      BUN 13 mg/dL      Creatinine 1.02 mg/dL      Sodium 143 mmol/L      Potassium 4.0 mmol/L      Chloride 100 mmol/L      CO2 33.0 (H) mmol/L      Calcium 9.2 mg/dL      Total Protein 6.7 g/dL      Albumin 4.00 g/dL      ALT (SGPT) 128 (H) U/L      AST (SGOT) 46 (H) U/L      Alkaline Phosphatase 60 U/L      Total Bilirubin 0.3 mg/dL      eGFR Non African Amer 84 mL/min/1.73      Globulin 2.7 gm/dL      A/G Ratio 1.5 g/dL      BUN/Creatinine Ratio 12.7     Anion Gap 10.0 mmol/L     Lipase [[id number]]  (Normal) Collected:  11/24/17 1603    Specimen:  Blood Updated:  11/24/17 1620     Lipase 39 U/L     Urinalysis With / Culture If Indicated - Urine, Clean Catch [[id number]]  (Abnormal) Collected:  11/24/17 1606    Specimen:  Urine from Urine, Clean Catch Updated:  11/24/17 1620     Color, UA Orange (A)     Appearance, UA Turbid (A)     pH, UA 7.0     Specific Gravity, UA 1.023     Glucose, UA Negative     Ketones, UA Negative     Bilirubin, UA Negative     Blood, UA Large (3+) (A)     Protein, UA >=300 mg/dL (3+) (A)     Leuk Esterase, UA Small (1+) (A)     Nitrite, UA Negative     Urobilinogen, UA 0.2 E.U./dL    Narrative:       Dipstick results may be inaccurate due to color interference.    Urinalysis, Microscopic Only - Urine, Clean Catch [[id number]]  (Abnormal) Collected:  11/24/17 1606    Specimen:  Urine from Urine, Clean Catch Updated:  11/24/17  1627     RBC, UA Too Numerous to Count (A) /HPF      WBC, UA 13-20 (A) /HPF      Bacteria, UA None Seen /HPF      Squamous Epithelial Cells, UA None Seen /HPF      Hyaline Casts, UA 0-2 /LPF      Methodology Automated Microscopy        Imaging Results (last 24 hours)     Procedure Component Value Units Date/Time    US Renal Bilateral [[id number]] Collected:  11/24/17 1654     Updated:  11/24/17 1700    Narrative:       EXAMINATION: US RENAL BILATERAL- 11/24/2017 4:54 PM CST     HISTORY: Abdominal pain status post ureteral stent removal     COMPARISON: CT abdomen and pelvis without contrast 11/15/2017     FINDINGS:  Transabdominal sonographic evaluation of the kidneys was performed in  the transverse and longitudinal projections. The right kidney appears  normal in size and echogenicity, measuring 13.0 cm in length. There is  mild dilatation of the right renal collecting system at the level of the  UPJ. No solid renal mass is demonstrated.     Left kidney appears normal in size and echogenicity, measuring 13.1 cm  in length. There is no sign of collecting system dilatation or solid  renal mass.     The aorta appears normal in caliber.     The urinary bladder is completely decompressed and therefore not  evaluated.     Due to patient's complaint of pain extending into the right testicle,  color flow evaluation of the testicles was performed, demonstrating  normal color flow bilaterally.       Impression:       Mild dilatation of the right renal collecting system.  Otherwise, normal appearance of the kidneys. Normal color flow is  demonstrated to both testicles.  This report was finalized on 11/24/2017 16:57 by Dr. Kalin Benz MD.          Assessment / Plan   Assessment:     Hospital Problem List     Flank pain         1.  Right ureteral obstruction following stent removal  2.  Hypertension  3.  Transaminitis  4.  Questionable sepsis during admission  5.  Leukocytosis  6.  Abdominal discomfort possibly underlying  constipation    Plan:     -Patient is currently admitted under urology service  -Ureteral obstruction management as per urology  -Pain management as per urology  -Monitor vitals  -Administer antihypertensive medication as needed overnight  -Continue patient's home hypertensive medication  -Initial hepatic panel noted  -Given patient has history of IV drug abuse and one sexual partner with hepatitis C in the past-will follow up hepatitis panel  -Follow-up abdominal ultrasound  -Hold or avoid hepatotoxic medications now  -Initially during admission patient met sepsis criteria  -Received IV fluid bolus in the ER  -Currently vitals does not indicate any signs of sepsis  -Will continue to monitor for sepsis  -Agree with Dr. Wise as would start broad-spectrum antibiotics if patient shows signs of acute infection or worsening symptoms  -Follow-up blood culture  -Follow-up urine culture  -Follow-up lactic acid  -Follow-up pro-calcitonin  -Follow-up a.m. WBC  -Consider aggressive fluid resuscitation if indicated  -Continue bowel regimen for constipation  -GI prophylaxis  -DVT prophylaxis    I discussed the patients findings and my recommendations with Patient and his RN .    Wallace Garcia MD  11/24/17  10:27 PM           Electronically signed by Wallace Garcia MD at 11/24/2017 10:48 PM           Discharge Summary      Zach Wise MD at 11/26/2017 11:47 AM            Date of Discharge:  11/26/2017    Discharge Diagnosis:   #1.  Right ureteral obstruction likely secondary to edema status post ureteroscopic laser lithotripsy  #2.  Right mid ureteral calculus-probable nonobstructive  #3.  Right testicular pain that was most likely referred pain from the distal ureteral obstruction  #4.  Hypertension.    Presenting Problem/History of Present Illness  Flank pain [R10.9]       Hospital Course  Patient is a 32 y.o. male presented with severe flank discomfort following removal of the patient's own stent.  CT scan did  confirm some evidence of obstruction.  Initially my plan was to replace ureteral stent that the patient did refuse this.  He continued to require parenteral analgesics but on the day of discharge had significant improvement.  He will be sent home on some oral analgesics..  A renal ultrasound to be repeated the next 1-2 weeks when he follows up with Dr. Gonzalez.    Procedures Performed         Consults:   Consults     Date and Time Order Name Status Description    11/24/2017 3243 Inpatient Consult to Hospitalist            Condition on Discharge:  Good    Vital Signs  Temp:  [97.7 °F (36.5 °C)-99.1 °F (37.3 °C)] 97.7 °F (36.5 °C)  Heart Rate:  [] 91  Resp:  [18] 18  BP: (136-150)/() 136/102      Discharge Disposition  Home or Self Care    Discharge Medications   Julien Berg   Home Medication Instructions HOLLEY:[id number]    Printed on:11/26/17 6068   Medication Information                      Escitalopram Oxalate (LEXAPRO PO)  Take 10 mg by mouth Every Morning.             HYDROcodone-acetaminophen (NORCO) 5-325 MG per tablet  Take 1 tablet by mouth Every 6 (Six) Hours As Needed for Moderate Pain  or Severe Pain  for up to 5 days.             lamoTRIgine (LaMICtal) 100 MG tablet  Take 100 mg by mouth Daily.             lisinopril (PRINIVIL,ZESTRIL) 10 MG tablet  Take 10 mg by mouth Every Morning.             naproxen (NAPROSYN) 500 MG tablet  Take 1 tablet by mouth 2 (Two) Times a Day With Meals.             ondansetron ODT (ZOFRAN-ODT) 4 MG disintegrating tablet  Take 1 tablet by mouth Every 8 (Eight) Hours As Needed for Nausea or Vomiting.             tamsulosin (FLOMAX) 0.4 MG capsule 24 hr capsule  Take 1 capsule by mouth Every Night.                 Discharge Diet:     Activity at Discharge:   Activity Instructions     Discharge Activity Restrictions       1) No driving for 1 day and no longer taking narcotics.   2) Return to school / work in 3 days.  3) May shower / sponge bathe today  4) Do not  lift / push / pull more then 10 lbs for 48 hours.                 Follow-up Appointments  Future Appointments  Date Time Provider Department Center   1/2/2018 8:00 AM Louis Gonzalez MD [facility] None     Additional Instructions for the Follow-ups that You Need to Schedule     Call MD With Problems / Concerns    As directed    Instructions: For temperature greater 101, inability to urinate, persistent nausea with vomiting.  You should expect blood-tinged urine for up to 2 weeks intermittently.  Contact us if having difficulty urinating due to the size of blood clots.       Discharge Follow-up with Specified Provider:    As directed    Follow Up Details:  [facility] 1 week. Renal Ultrasound needed prior to discharge                 Test Results Pending at Discharge   Order Current Status    AFP Tumor Marker In process           Zach Wise MD  11/26/17  11:47 AM    Time: Discharge 15 min             Electronically signed by Zach Wise MD at 11/26/2017 11:59 AM

## 2025-05-19 NOTE — PROGRESS NOTES
Chief complaint: Right testicular pain    HPI:    Hx: Severe onset of right testicular pain. No flank pain but some discomfort in the right inguinal area. No hematuria. This was not relieved by 0.5 mg hydromorphone. He denies nausea.     ROS: Constitutional: Negative for chills and fever.   Gastrointestinal: Negative for abdominal pain, anal bleeding and blood in stool.   Genitourinary: Negative for flank pain and hematuria.      Medication Review:     Current Facility-Administered Medications:   •  dextrose 5 % and sodium chloride 0.45 % infusion, 125 mL/hr, Intravenous, Continuous, Zach Wise MD, Last Rate: 125 mL/hr at 11/25/17 0419, 125 mL/hr at 11/25/17 0419  •  HYDROmorphone (DILAUDID) injection 1 mg, 1 mg, Intravenous, Q4H PRN, Zach Wise MD  •  ketorolac (TORADOL) injection 15 mg, 15 mg, Intravenous, Q6H PRN, Zach Wise MD  •  ketorolac (TORADOL) injection 30 mg, 30 mg, Intravenous, Q6H PRN, Zach Wise MD  •  magnesium hydroxide (MILK OF MAGNESIA) suspension 2400 mg/10mL 10 mL, 10 mL, Oral, Nightly, Zach Wise MD, 10 mL at 11/24/17 2014  •  ondansetron (ZOFRAN) injection 4 mg, 4 mg, Intravenous, Q8H PRN, Zach Wise MD  •  Insert peripheral IV, , , Once **AND** sodium chloride 0.9 % flush 10 mL, 10 mL, Intravenous, PRN, Alina Durham MD  EXAM:     Temp:  [97.6 °F (36.4 °C)-98.3 °F (36.8 °C)] 98.2 °F (36.8 °C)  Heart Rate:  [] 105  Resp:  [15-22] 20  BP: (130-178)/() 130/88  Very agitated  Completely disrobed with no linen over him.   Seems to be in severe distress.  Alert and oriented x 3.   Not diaphoretic.   No CVA tenderness  Slight right mid abdominal tenderness.   Scrotum with erythema. Left testis is normal    The right testis seems tender but is normal in consistency.  There is a normal wide.  I cannot feel any evidence of torsion of the cord.  There is no evidence of an inguinal hernia.  The patient is writhing.    DATA Review:         I  reviewed the patient's new clinical results.  Lab Results (last 24 hours)     Procedure Component Value Units Date/Time    CBC & Differential [[id number]] Collected:  11/24/17 1603    Specimen:  Blood Updated:  11/24/17 1616    Narrative:       The following orders were created for panel order CBC & Differential.  Procedure                               Abnormality         Status                     ---------                               -----------         ------                     CBC Auto Differential[[id number]]        Abnormal            Final result                 Please view results for these tests on the individual orders.    CBC Auto Differential [[id number]]  (Abnormal) Collected:  11/24/17 1603    Specimen:  Blood Updated:  11/24/17 1616     WBC 12.71 (H) 10*3/mm3      RBC 4.67 (L) 10*6/mm3      Hemoglobin 13.6 (L) g/dL      Hematocrit 40.6 %      MCV 86.9 fL      MCH 29.1 pg      MCHC 33.5 g/dL      RDW 13.7 %      RDW-SD 43.0 fl      MPV 9.3 fL      Platelets 311 10*3/mm3      Neutrophil % 76.9 %      Lymphocyte % 13.4 (L) %      Monocyte % 7.4 %      Eosinophil % 2.1 %      Basophil % 0.2 %      Neutrophils, Absolute 9.78 (H) 10*3/mm3      Lymphocytes, Absolute 1.70 10*3/mm3      Monocytes, Absolute 0.94 10*3/mm3      Eosinophils, Absolute 0.27 10*3/mm3      Basophils, Absolute 0.02 10*3/mm3     Comprehensive Metabolic Panel [[id number]]  (Abnormal) Collected:  11/24/17 1603    Specimen:  Blood Updated:  11/24/17 1620     Glucose 106 (H) mg/dL      BUN 13 mg/dL      Creatinine 1.02 mg/dL      Sodium 143 mmol/L      Potassium 4.0 mmol/L      Chloride 100 mmol/L      CO2 33.0 (H) mmol/L      Calcium 9.2 mg/dL      Total Protein 6.7 g/dL      Albumin 4.00 g/dL      ALT (SGPT) 128 (H) U/L      AST (SGOT) 46 (H) U/L      Alkaline Phosphatase 60 U/L      Total Bilirubin 0.3 mg/dL      eGFR Non African Amer 84 mL/min/1.73      Globulin 2.7 gm/dL      A/G Ratio 1.5 g/dL      BUN/Creatinine Ratio 12.7     Anion  Gap 10.0 mmol/L     Lipase [[id number]]  (Normal) Collected:  11/24/17 1603    Specimen:  Blood Updated:  11/24/17 1620     Lipase 39 U/L     Urinalysis With / Culture If Indicated - Urine, Clean Catch [[id number]]  (Abnormal) Collected:  11/24/17 1606    Specimen:  Urine from Urine, Clean Catch Updated:  11/24/17 1620     Color, UA Orange (A)     Appearance, UA Turbid (A)     pH, UA 7.0     Specific Gravity, UA 1.023     Glucose, UA Negative     Ketones, UA Negative     Bilirubin, UA Negative     Blood, UA Large (3+) (A)     Protein, UA >=300 mg/dL (3+) (A)     Leuk Esterase, UA Small (1+) (A)     Nitrite, UA Negative     Urobilinogen, UA 0.2 E.U./dL    Narrative:       Dipstick results may be inaccurate due to color interference.    Urinalysis, Microscopic Only - Urine, Clean Catch [[id number]]  (Abnormal) Collected:  11/24/17 1606    Specimen:  Urine from Urine, Clean Catch Updated:  11/24/17 1627     RBC, UA Too Numerous to Count (A) /HPF      WBC, UA 13-20 (A) /HPF      Bacteria, UA None Seen /HPF      Squamous Epithelial Cells, UA None Seen /HPF      Hyaline Casts, UA 0-2 /LPF      Methodology Automated Microscopy    CBC & Differential [[id number]] Collected:  11/25/17 0524    Specimen:  Blood Updated:  11/25/17 0553    Narrative:       The following orders were created for panel order CBC & Differential.  Procedure                               Abnormality         Status                     ---------                               -----------         ------                     CBC Auto Differential[[id number]]        Abnormal            Final result                 Please view results for these tests on the individual orders.    CBC Auto Differential [[id number]]  (Abnormal) Collected:  11/25/17 0524    Specimen:  Blood Updated:  11/25/17 0553     WBC 9.74 10*3/mm3      RBC 4.44 (L) 10*6/mm3      Hemoglobin 12.8 (L) g/dL      Hematocrit 39.8 (L) %      MCV 89.6 fL      MCH 28.8 pg      MCHC 32.2 (L) g/dL      RDW  14.1 %      RDW-SD 46.1 fl      MPV 9.8 fL      Platelets 275 10*3/mm3      Neutrophil % 62.4 %      Lymphocyte % 25.6 %      Monocyte % 7.6 %      Eosinophil % 3.8 %      Basophil % 0.3 %      Immature Grans % 0.3 %      Neutrophils, Absolute 6.08 10*3/mm3      Lymphocytes, Absolute 2.49 10*3/mm3      Monocytes, Absolute 0.74 10*3/mm3      Eosinophils, Absolute 0.37 10*3/mm3      Basophils, Absolute 0.03 10*3/mm3      Immature Grans, Absolute 0.03 10*3/mm3     Comprehensive Metabolic Panel [[id number]]  (Abnormal) Collected:  11/25/17 0524    Specimen:  Blood Updated:  11/25/17 0555     Glucose 127 (H) mg/dL      BUN 10 mg/dL      Creatinine 0.88 mg/dL      Sodium 142 mmol/L      Potassium 4.1 mmol/L      Chloride 105 mmol/L      CO2 29.0 mmol/L      Calcium 8.9 mg/dL      Total Protein 6.1 (L) g/dL      Albumin 3.60 g/dL      ALT (SGPT) 117 (H) U/L      AST (SGOT) 57 (H) U/L      Alkaline Phosphatase 54 U/L      Total Bilirubin 0.1 mg/dL      eGFR Non African Amer 99 mL/min/1.73      Globulin 2.5 gm/dL      A/G Ratio 1.4 g/dL      BUN/Creatinine Ratio 11.4     Anion Gap 8.0 mmol/L     Magnesium [[id number]]  (Normal) Collected:  11/25/17 0524    Specimen:  Blood Updated:  11/25/17 0555     Magnesium 2.0 mg/dL     Lactic Acid, Reflex Timer [[id number]] Collected:  11/25/17 0524    Specimen:  Blood Updated:  11/25/17 0606    Lactic Acid, Plasma [[id number]]  (Abnormal) Collected:  11/25/17 0524    Specimen:  Blood Updated:  11/25/17 0606     Lactate 2.5 (C) mmol/L     Urine Culture - Urine, Urine, Clean Catch [[id number]]  (Normal) Collected:  11/24/17 1606    Specimen:  Urine from Urine, Clean Catch Updated:  11/25/17 0610     Urine Culture No growth at 24 hours    Hepatitis Panel, Acute [[id number]]  (Normal) Collected:  11/25/17 0524    Specimen:  Blood Updated:  11/25/17 0643     HCV S/C Ratio 0.03     Hepatitis C Ab Negative     Hep A IgM Negative     Hep B C IgM Negative     Hepatitis B Surface Ag Negative     Procalcitonin [[id number]]  (Normal) Collected:  11/25/17 0524    Specimen:  Blood Updated:  11/25/17 0705     Procalcitonin <0.25 ng/mL     Narrative:       SIRS, sepsis, severe sepsis, and septic shock are categorized according to the criteria of the consensus conference of the American College of Chest Physicians/Society of Critical Care Medicine.    PCT < 0.5 ng/mL     Systemic infection (sepsis) is not likely.    PCT >0.5 and < 2.0 ng/mL Systemic infection (sepsis) is possible, but other conditions are known to elevate PCT as well.    PCT > 2.0 ng/mL     Systemic infection (sepsis) is likely, unless other causes are known.      PCT > 10.0 ng/mL    Important systemic inflammatory response, almost exclusively due to severe bacterial sepsis or septic shock.    PCT values of < 0.5 ng/mL do not exclude an infection, because localized infections (without systemic signs) may be associated with such low concentrations, or a systemic infection in its initial stages (<6 hours).  Increased PCT can occur without infection.  PCT concentrations between 0.5 and 2.0 ng/mL should be interpreted taking into account the patients history.  It is recommended to retest PCT within 6-24 hours if any concentrations < 2.0 ng/mL are obtained.        Imaging Results (last 24 hours)     Procedure Component Value Units Date/Time    US Renal Bilateral [[id number]] Collected:  11/24/17 1654     Updated:  11/24/17 1700    Narrative:       EXAMINATION: US RENAL BILATERAL- 11/24/2017 4:54 PM CST     HISTORY: Abdominal pain status post ureteral stent removal     COMPARISON: CT abdomen and pelvis without contrast 11/15/2017     FINDINGS:  Transabdominal sonographic evaluation of the kidneys was performed in  the transverse and longitudinal projections. The right kidney appears  normal in size and echogenicity, measuring 13.0 cm in length. There is  mild dilatation of the right renal collecting system at the level of the  UPJ. No solid renal mass is  "demonstrated.     Left kidney appears normal in size and echogenicity, measuring 13.1 cm  in length. There is no sign of collecting system dilatation or solid  renal mass.     The aorta appears normal in caliber.     The urinary bladder is completely decompressed and therefore not  evaluated.     Due to patient's complaint of pain extending into the right testicle,  color flow evaluation of the testicles was performed, demonstrating  normal color flow bilaterally.       Impression:       Mild dilatation of the right renal collecting system.  Otherwise, normal appearance of the kidneys. Normal color flow is  demonstrated to both testicles.  This report was finalized on 11/24/2017 16:57 by Dr. Kalin Benz MD.          Assessment/Plan:     #1. Right testicular discomfort. Exam is inconsistent with torsion. Will get a scrotal ultrasound with doppler. I believe this to be referred pain from ureteral obstruction. He may have a residual stone fragment.   #2. Right hydronephrosis. ? Residual stone fragment vs. Edema after stent removal yesterday.     -Will obtain a CT scan of the abdomen and pelvis using stone protocol to follow-up hydronephrosis and look for residual stone fragment.  -Will obtain a scrotal ultrasound with Doppler to rule out testicular torsion which is inconsistent with my exam.    This patient is very upset with me.  He is refusing to have ureteral stent which I did mention last night since he has residual hydronephrosis that may be required if he has continued symptoms from obstruction.  This morning he adamantly refuses to have a stent placed. He stated \"I have heard about specialists like you that just want to do procedures\".  I have explained to him that we at least need to see what the studies would be before I would recommend anything further.  Patient did call his grandmother who I discussed the case with by phone.  This was obviously done with his permission as he calls the grandmother himself " being on the phone with her when I entered the room after he requested I returned from the initial visit.    Zach Wise MD  11/25/17  7:49 AM    Addendum: Independent review of a CT scan of abdomen and pelvis without contrast  The CT scan of the abdomen/pelvis done without contrast is available for me to review.  Treatment recommendations require an independent review.  First I scanned the liver, spleen, and bowel pattern.  The retroperitoneum including the major vessels and lymphatic packages are briefly reviewed.  This film as been reviewed by the radiologist to determine any non urologic abnormalities that are present.  The kidneys are closely inspected for size, symmetry, contour, parenchymal thickness, perinephric reaction, presence of calcifications, and intrarenal dilation of the collecting system.  The ureters are inspected for their course, caliber, and any calcifications.  The bladder is inspected for its thickness, size, and presence of any calcifications.  This scan shows a 2 mm fragment just below the level of the vessels. There is moderate hydroureteronephrosis extending well below this most consistent with obstruction below the stone from the procedure which is not unusual.     Plan: Await scrotal US results due to patient reported severity but he has experienced testicular pain throughout this stone episode. Since he refuses intervention with stent with or without ureteroscopic stone extraction, will try him on an an alpha blocker and use parenteral analgesics with Toradol and hydromorphone.          alcohol swabs: Apply topically to affected area 4 times a day  atorvastatin 20 mg oral tablet: 1 tab(s) orally once a day (at bedtime)  Dexcom G7 : Use as directed  Dexcom G7 Sensors: Change every 10 days  glucometer (per patient&#x27;s insurance): Test blood sugars four times a day. Dispense #1 glucometer.  Insulin Pen Needles, 4mm: 1 application subcutaneously 4 times a day. ** Use with insulin pen **  lancets: 1 application subcutaneously 4 times a day  Lantus Solostar Pen 100 units/mL subcutaneous solution: 30 unit(s) subcutaneous once a day unit(s) subcutaneous once a day  losartan 25 mg oral tablet: 1 tab(s) orally once a day  metFORMIN 500 mg oral tablet: 1 tab(s) orally 2 times a day (with meals) for one week. If tolerating on day 8, increase to two tablets (1000 mG) twice a day (with meals).  test strips (per patient&#x27;s insurance): 1 application subcutaneously 4 times a day. ** Compatible with patient&#x27;s glucometer **

## (undated) DEVICE — FIBR LASR FLEXIVA 200 HIPOWR 1P/U

## (undated) DEVICE — CATH URETH FLEXIMA CONE TP 5F 70CM

## (undated) DEVICE — GW SENSR DUALFLEX NITNL STR .038IN 3X150CM

## (undated) DEVICE — ENDOSCOPIC SEAL URO 1 SIZE FITS ALL: Brand: ENDOSCOPIC SEAL

## (undated) DEVICE — PK TURNOVER CYSTO RM

## (undated) DEVICE — URETERAL ACCESS SHEATH SET: Brand: NAVIGATOR HD

## (undated) DEVICE — PK CYSTO 30

## (undated) DEVICE — NITINOL STONE RETRIEVAL BASKET: Brand: ZERO TIP

## (undated) DEVICE — DRSNG SURESITE WNDW 4X4.5

## (undated) DEVICE — GLV SURG BIOGEL M LTX PF 7 1/2

## (undated) DEVICE — DRSNG SURESITE WNDW 2.38X2.75